# Patient Record
Sex: MALE | Race: WHITE | NOT HISPANIC OR LATINO | Employment: FULL TIME
[De-identification: names, ages, dates, MRNs, and addresses within clinical notes are randomized per-mention and may not be internally consistent; named-entity substitution may affect disease eponyms.]

---

## 2017-04-24 PROBLEM — IMO0002 NUCLEAR CATARACT, BILATERAL: Status: ACTIVE | Noted: 2017-04-24

## 2017-08-29 PROCEDURE — 88305 TISSUE EXAM BY PATHOLOGIST: CPT | Performed by: INTERNAL MEDICINE

## 2018-04-16 ENCOUNTER — HOSPITAL (OUTPATIENT)
Dept: OTHER | Age: 63
End: 2018-04-16
Attending: EMERGENCY MEDICINE

## 2018-04-16 PROBLEM — H33.001 RETINAL DETACHMENT, RHEGMATOGENOUS, RIGHT EYE: Status: ACTIVE | Noted: 2018-04-16

## 2018-12-08 NOTE — LETTER
April Matta 182  [address] [facility] [address], [address] [facility]  Authorization for Surgical Operation and Procedure     Date:___________                                                                                                         Time:__________ Telephone Encounter by Naveen Youssef RN at 05/15/17 08:56 AM     Author:  Naveen Youssef RN Service:  (none) Author Type:  Registered Nurse     Filed:  05/15/17 09:02 AM Encounter Date:  5/15/2017 Status:  Signed     :  Naveen Youssef RN (Registered Nurse)            Patient states she has intermittent nausea, occasionally will vomit, states she feels like she has 'nervous stomach', feels like 'spasms'. Patient takes Flexirel, states her stomach will feel better after she takes this. Patient states it feels like her stomach is pushing her food back up, and burps foods hours later. Patient states she takes Prevacid 30mg before breakfast and dinner, states she will take Zantac OTC 150mg at lunch. Denies abd pain. Patient states excessive gas, burping. Patient states her BM are normal.  Msg to KJ.[TD1.1M]  Electronically Signed by:     Naveen Youssef RN , 5/15/2017[TD1.2T]        Revision History        User Key Date/Time User Provider Type Action    > TD1.2 05/15/17 09:02 AM Naveen Youssef RN Registered Nurse Sign     TD1.1 05/15/17 08:56 AM Naveen Youssef RN Registered Nurse     M - Manual, T - Template that can occur: fever and allergic reactions, hemolytic reactions, transmission of diseases such as Hepatitis, AIDS and Cytomegalovirus (CMV) and fluid overload.   In the event that I wish to have an autologous transfusion of my own blood, or a directed don when the applicable recovery period ends for purposes of reinstating the DNAR order.   10. Patients having a sterilization procedure: I understand that if the procedure is successful the results will be permanent and it will therefore be impossible for me t to give me medicine and do additional procedures as necessary. Some examples are: Starting or using an “IV” to give me medicine, fluids or blood during my procedure, and having a breathing tube placed to help me breathe when I’m asleep (intubation).  In the that rare but potential complications include headache, bleeding, infection, seizure, irregular heart rhythms, and nerve injury.     I can change my mind about having anesthesia services at any time before I get the medicine.    ____________________________

## 2020-08-27 ENCOUNTER — HOSPITAL ENCOUNTER (OUTPATIENT)
Age: 65
Setting detail: OBSERVATION
Discharge: HOME | End: 2020-08-28
Attending: EMERGENCY MEDICINE | Admitting: INTERNAL MEDICINE

## 2020-08-27 DIAGNOSIS — T78.3XXA ANGIOEDEMA OF LIPS, INITIAL ENCOUNTER: Primary | ICD-10-CM

## 2020-08-27 LAB
ANION GAP SERPL CALC-SCNC: 12 MMOL/L (ref 10–20)
BASOPHILS # BLD: 0 K/MCL (ref 0–0.3)
BASOPHILS NFR BLD: 0 %
BUN SERPL-MCNC: 26 MG/DL (ref 6–20)
BUN/CREAT SERPL: 30 (ref 7–25)
CALCIUM SERPL-MCNC: 8.4 MG/DL (ref 8.4–10.2)
CHLORIDE SERPL-SCNC: 109 MMOL/L (ref 98–107)
CO2 SERPL-SCNC: 25 MMOL/L (ref 21–32)
CREAT SERPL-MCNC: 0.87 MG/DL (ref 0.67–1.17)
DIFFERENTIAL METHOD BLD: NORMAL
EOSINOPHIL # BLD: 0.2 K/MCL (ref 0.1–0.5)
EOSINOPHIL NFR BLD: 2 %
ERYTHROCYTE [DISTWIDTH] IN BLOOD: 12.8 % (ref 11–15)
GLUCOSE SERPL-MCNC: 117 MG/DL (ref 65–99)
HCT VFR BLD CALC: 44.5 % (ref 39–51)
HGB BLD-MCNC: 14.9 G/DL (ref 13–17)
IMM GRANULOCYTES # BLD AUTO: 0 K/MCL (ref 0–0.2)
IMM GRANULOCYTES NFR BLD: 0 %
LYMPHOCYTES # BLD: 1.7 K/MCL (ref 1–4)
LYMPHOCYTES NFR BLD: 24 %
MCH RBC QN AUTO: 30.9 PG (ref 26–34)
MCHC RBC AUTO-ENTMCNC: 33.5 G/DL (ref 32–36.5)
MCV RBC AUTO: 92.3 FL (ref 78–100)
MONOCYTES # BLD: 0.6 K/MCL (ref 0.3–0.9)
MONOCYTES NFR BLD: 8 %
NEUTROPHILS # BLD: 4.7 K/MCL (ref 1.8–7.7)
NEUTROPHILS NFR BLD: 66 %
NRBC BLD MANUAL-RTO: 0 /100 WBC
PLATELET # BLD: 214 K/MCL (ref 140–450)
POTASSIUM SERPL-SCNC: 3.5 MMOL/L (ref 3.4–5.1)
RBC # BLD: 4.82 MIL/MCL (ref 4.5–5.9)
SODIUM SERPL-SCNC: 142 MMOL/L (ref 135–145)
WBC # BLD: 7.1 K/MCL (ref 4.2–11)

## 2020-08-27 PROCEDURE — 10002801 HB RX 250 W/O HCPCS: Performed by: EMERGENCY MEDICINE

## 2020-08-27 PROCEDURE — 80320 DRUG SCREEN QUANTALCOHOLS: CPT

## 2020-08-27 PROCEDURE — 96374 THER/PROPH/DIAG INJ IV PUSH: CPT

## 2020-08-27 PROCEDURE — 99284 EMERGENCY DEPT VISIT MOD MDM: CPT

## 2020-08-27 PROCEDURE — 10002800 HB RX 250 W HCPCS: Performed by: EMERGENCY MEDICINE

## 2020-08-27 PROCEDURE — 96372 THER/PROPH/DIAG INJ SC/IM: CPT

## 2020-08-27 PROCEDURE — 80048 BASIC METABOLIC PNL TOTAL CA: CPT

## 2020-08-27 PROCEDURE — G0378 HOSPITAL OBSERVATION PER HR: HCPCS

## 2020-08-27 PROCEDURE — 96375 TX/PRO/DX INJ NEW DRUG ADDON: CPT

## 2020-08-27 PROCEDURE — 85025 COMPLETE CBC W/AUTO DIFF WBC: CPT

## 2020-08-27 PROCEDURE — G0480 DRUG TEST DEF 1-7 CLASSES: HCPCS

## 2020-08-27 RX ORDER — ATORVASTATIN CALCIUM 40 MG/1
40 TABLET, FILM COATED ORAL DAILY
COMMUNITY

## 2020-08-27 RX ORDER — ATORVASTATIN CALCIUM 40 MG/1
40 TABLET, FILM COATED ORAL DAILY
Status: DISCONTINUED | OUTPATIENT
Start: 2020-08-28 | End: 2020-08-28 | Stop reason: HOSPADM

## 2020-08-27 RX ORDER — LOSARTAN POTASSIUM 50 MG/1
50 TABLET ORAL DAILY
Status: ON HOLD | COMMUNITY
End: 2020-08-28 | Stop reason: HOSPADM

## 2020-08-27 RX ORDER — PANTOPRAZOLE SODIUM 40 MG/1
40 TABLET, DELAYED RELEASE ORAL
Status: DISCONTINUED | OUTPATIENT
Start: 2020-08-28 | End: 2020-08-28 | Stop reason: HOSPADM

## 2020-08-27 RX ORDER — CHOLECALCIFEROL (VITAMIN D3) 125 MCG
500 CAPSULE ORAL DAILY
COMMUNITY

## 2020-08-27 RX ORDER — POTASSIUM CHLORIDE 20 MEQ/1
40 TABLET, EXTENDED RELEASE ORAL ONCE
Status: DISCONTINUED | OUTPATIENT
Start: 2020-08-28 | End: 2020-08-27 | Stop reason: CLARIF

## 2020-08-27 RX ORDER — OMEPRAZOLE 20 MG/1
20 CAPSULE, DELAYED RELEASE ORAL DAILY
COMMUNITY

## 2020-08-27 RX ORDER — ENOXAPARIN SODIUM 100 MG/ML
40 INJECTION SUBCUTANEOUS EVERY 24 HOURS
Status: DISCONTINUED | OUTPATIENT
Start: 2020-08-28 | End: 2020-08-28 | Stop reason: HOSPADM

## 2020-08-27 RX ORDER — METHYLPREDNISOLONE SODIUM SUCCINATE 40 MG/ML
80 INJECTION, POWDER, LYOPHILIZED, FOR SOLUTION INTRAMUSCULAR; INTRAVENOUS EVERY 8 HOURS SCHEDULED
Status: DISCONTINUED | OUTPATIENT
Start: 2020-08-28 | End: 2020-08-28 | Stop reason: HOSPADM

## 2020-08-27 RX ORDER — DIPHENHYDRAMINE HYDROCHLORIDE 50 MG/ML
25 INJECTION INTRAMUSCULAR; INTRAVENOUS EVERY 8 HOURS
Status: DISCONTINUED | OUTPATIENT
Start: 2020-08-28 | End: 2020-08-28 | Stop reason: HOSPADM

## 2020-08-27 RX ORDER — DIPHENHYDRAMINE HYDROCHLORIDE 50 MG/ML
50 INJECTION INTRAMUSCULAR; INTRAVENOUS ONCE
Status: COMPLETED | OUTPATIENT
Start: 2020-08-27 | End: 2020-08-27

## 2020-08-27 RX ORDER — CHOLECALCIFEROL (VITAMIN D3) 125 MCG
500 CAPSULE ORAL DAILY
Status: DISCONTINUED | OUTPATIENT
Start: 2020-08-28 | End: 2020-08-28 | Stop reason: HOSPADM

## 2020-08-27 RX ORDER — FAMOTIDINE 10 MG/ML
20 INJECTION, SOLUTION INTRAVENOUS EVERY 12 HOURS SCHEDULED
Status: DISCONTINUED | OUTPATIENT
Start: 2020-08-28 | End: 2020-08-28 | Stop reason: HOSPADM

## 2020-08-27 RX ORDER — POTASSIUM CHLORIDE 20 MEQ/1
40 TABLET, EXTENDED RELEASE ORAL ONCE
Status: COMPLETED | OUTPATIENT
Start: 2020-08-28 | End: 2020-08-28

## 2020-08-27 RX ORDER — METHYLPREDNISOLONE SODIUM SUCCINATE 125 MG/2ML
125 INJECTION, POWDER, LYOPHILIZED, FOR SOLUTION INTRAMUSCULAR; INTRAVENOUS ONCE
Status: COMPLETED | OUTPATIENT
Start: 2020-08-27 | End: 2020-08-27

## 2020-08-27 RX ORDER — FAMOTIDINE 10 MG/ML
20 INJECTION, SOLUTION INTRAVENOUS ONCE
Status: COMPLETED | OUTPATIENT
Start: 2020-08-27 | End: 2020-08-27

## 2020-08-27 RX ORDER — VALACYCLOVIR HYDROCHLORIDE 500 MG/1
1000 TABLET, FILM COATED ORAL DAILY
Status: DISCONTINUED | OUTPATIENT
Start: 2020-08-28 | End: 2020-08-28 | Stop reason: HOSPADM

## 2020-08-27 RX ORDER — VALACYCLOVIR HYDROCHLORIDE 1 G/1
1000 TABLET, FILM COATED ORAL DAILY
COMMUNITY

## 2020-08-27 RX ADMIN — EPINEPHRINE 0.3 MG: 1 INJECTION, SOLUTION, CONCENTRATE INTRAVENOUS at 20:19

## 2020-08-27 RX ADMIN — DIPHENHYDRAMINE HYDROCHLORIDE 50 MG: 50 INJECTION INTRAMUSCULAR; INTRAVENOUS at 20:19

## 2020-08-27 RX ADMIN — METHYLPREDNISOLONE SODIUM SUCCINATE 125 MG: 125 INJECTION, POWDER, FOR SOLUTION INTRAMUSCULAR; INTRAVENOUS at 20:19

## 2020-08-27 RX ADMIN — FAMOTIDINE 20 MG: 10 INJECTION, SOLUTION INTRAVENOUS at 20:19

## 2020-08-27 ASSESSMENT — LIFESTYLE VARIABLES
ANXIETY: NO ANXIETY, AT EASE
AGITATION: NORMAL ACTIVITY
TACTILE DISTURBANCES: NOT PRESENT
HOW OFTEN DO YOU HAVE A DRINK CONTAINING ALCOHOL: 4 OR MORE TIMES PER WEEK
HOW MANY STANDARD DRINKS CONTAINING ALCOHOL DO YOU HAVE ON A TYPICAL DAY: 0,1 OR 2
PAROXYSMAL SWEATS: BARELY PERCEPTIBLE SWEATING, PALMS MOIST
ALCOHOL_USE_STATUS: NO OR LOW RISK WITH VALIDATED TOOL
HOW OFTEN DO YOU HAVE 6 OR MORE DRINKS ON ONE OCCASION: NEVER
AUDIT-C TOTAL SCORE: 4
VISUAL DISTURBANCES: NOT PRESENT
NAUSEA AND VOMITING: NO NAUSEA AND NO VOMITING
HEADACHE, FULLNESS IN HEAD: NOT PRESENT
TREMOR: NO TREMOR
AUDITORY DISTURBANCES: NOT PRESENT

## 2020-08-27 ASSESSMENT — ACTIVITIES OF DAILY LIVING (ADL)
ADL_SHORT_OF_BREATH: NO
ADL_SCORE: 12
RECENT_DECLINE_ADL: NO
ADL_BEFORE_ADMISSION: INDEPENDENT
CHRONIC_PAIN_PRESENT: NO

## 2020-08-27 ASSESSMENT — PATIENT HEALTH QUESTIONNAIRE - PHQ9
SUM OF ALL RESPONSES TO PHQ9 QUESTIONS 1 AND 2: 0
2. FEELING DOWN, DEPRESSED OR HOPELESS: NOT AT ALL
CLINICAL INTERPRETATION OF PHQ2 SCORE: NO FURTHER SCREENING NEEDED
IS PATIENT ABLE TO COMPLETE PHQ2 OR PHQ9: YES
SUM OF ALL RESPONSES TO PHQ9 QUESTIONS 1 AND 2: 0
1. LITTLE INTEREST OR PLEASURE IN DOING THINGS: NOT AT ALL
CLINICAL INTERPRETATION OF PHQ9 SCORE: NO FURTHER SCREENING NEEDED

## 2020-08-27 ASSESSMENT — COGNITIVE AND FUNCTIONAL STATUS - GENERAL
DO YOU HAVE SERIOUS DIFFICULTY WALKING OR CLIMBING STAIRS: NO
BECAUSE OF A PHYSICAL, MENTAL, OR EMOTIONAL CONDITION, DO YOU HAVE SERIOUS DIFFICULTY CONCENTRATING, REMEMBERING OR MAKING DECISIONS: NO
DO YOU HAVE DIFFICULTY DRESSING OR BATHING: NO
ARE YOU BLIND OR DO YOU HAVE SERIOUS DIFFICULTY SEEING, EVEN WHEN WEARING GLASSES: NO
BECAUSE OF A PHYSICAL, MENTAL, OR EMOTIONAL CONDITION, DO YOU HAVE DIFFICULTY DOING ERRANDS ALONE: NO
ARE YOU DEAF OR DO YOU HAVE SERIOUS DIFFICULTY  HEARING: NO

## 2020-08-27 ASSESSMENT — PAIN SCALES - GENERAL: PAINLEVEL_OUTOF10: 0

## 2020-08-27 ASSESSMENT — COLUMBIA-SUICIDE SEVERITY RATING SCALE - C-SSRS
IS THE PATIENT ABLE TO COMPLETE C-SSRS: YES
2. HAVE YOU ACTUALLY HAD ANY THOUGHTS OF KILLING YOURSELF?: NO
6. HAVE YOU EVER DONE ANYTHING, STARTED TO DO ANYTHING, OR PREPARED TO DO ANYTHING TO END YOUR LIFE?: NO
1. WITHIN THE PAST MONTH, HAVE YOU WISHED YOU WERE DEAD OR WISHED YOU COULD GO TO SLEEP AND NOT WAKE UP?: NO

## 2020-08-28 VITALS
SYSTOLIC BLOOD PRESSURE: 160 MMHG | OXYGEN SATURATION: 95 % | BODY MASS INDEX: 35.24 KG/M2 | WEIGHT: 260.14 LBS | HEART RATE: 83 BPM | DIASTOLIC BLOOD PRESSURE: 80 MMHG | TEMPERATURE: 98.2 F | RESPIRATION RATE: 15 BRPM | HEIGHT: 72 IN

## 2020-08-28 LAB
ANION GAP SERPL CALC-SCNC: 10 MMOL/L (ref 10–20)
BUN SERPL-MCNC: 21 MG/DL (ref 6–20)
BUN/CREAT SERPL: 30 (ref 7–25)
CALCIUM SERPL-MCNC: 8.8 MG/DL (ref 8.4–10.2)
CHLORIDE SERPL-SCNC: 111 MMOL/L (ref 98–107)
CO2 SERPL-SCNC: 24 MMOL/L (ref 21–32)
CREAT SERPL-MCNC: 0.7 MG/DL (ref 0.67–1.17)
ERYTHROCYTE [DISTWIDTH] IN BLOOD: 12.8 % (ref 11–15)
ETHANOL SERPL-MCNC: 22 MG/DL
GLUCOSE SERPL-MCNC: 146 MG/DL (ref 65–99)
HCT VFR BLD CALC: 43.1 % (ref 39–51)
HGB BLD-MCNC: 14.5 G/DL (ref 13–17)
MCH RBC QN AUTO: 31.1 PG (ref 26–34)
MCHC RBC AUTO-ENTMCNC: 33.6 G/DL (ref 32–36.5)
MCV RBC AUTO: 92.5 FL (ref 78–100)
NRBC BLD MANUAL-RTO: 0 /100 WBC
PLATELET # BLD: 210 K/MCL (ref 140–450)
POTASSIUM SERPL-SCNC: 4.1 MMOL/L (ref 3.4–5.1)
RBC # BLD: 4.66 MIL/MCL (ref 4.5–5.9)
SODIUM SERPL-SCNC: 141 MMOL/L (ref 135–145)
WBC # BLD: 5 K/MCL (ref 4.2–11)

## 2020-08-28 PROCEDURE — 10002803 HB RX 637: Performed by: INTERNAL MEDICINE

## 2020-08-28 PROCEDURE — 96376 TX/PRO/DX INJ SAME DRUG ADON: CPT

## 2020-08-28 PROCEDURE — G0378 HOSPITAL OBSERVATION PER HR: HCPCS

## 2020-08-28 PROCEDURE — 96372 THER/PROPH/DIAG INJ SC/IM: CPT

## 2020-08-28 PROCEDURE — 85027 COMPLETE CBC AUTOMATED: CPT

## 2020-08-28 PROCEDURE — 80048 BASIC METABOLIC PNL TOTAL CA: CPT

## 2020-08-28 PROCEDURE — 36415 COLL VENOUS BLD VENIPUNCTURE: CPT

## 2020-08-28 PROCEDURE — 10002801 HB RX 250 W/O HCPCS: Performed by: INTERNAL MEDICINE

## 2020-08-28 PROCEDURE — 10002800 HB RX 250 W HCPCS: Performed by: INTERNAL MEDICINE

## 2020-08-28 RX ORDER — PREDNISONE 20 MG/1
40 TABLET ORAL DAILY
Qty: 10 TABLET | Refills: 0 | Status: SHIPPED | OUTPATIENT
Start: 2020-08-28 | End: 2020-09-02

## 2020-08-28 RX ORDER — HYDROCHLOROTHIAZIDE 25 MG/1
25 TABLET ORAL DAILY
Qty: 30 TABLET | Refills: 0 | Status: ON HOLD | OUTPATIENT
Start: 2020-08-28 | End: 2020-08-31

## 2020-08-28 RX ADMIN — DIPHENHYDRAMINE HYDROCHLORIDE 25 MG: 50 INJECTION INTRAMUSCULAR; INTRAVENOUS at 09:48

## 2020-08-28 RX ADMIN — DIPHENHYDRAMINE HYDROCHLORIDE 25 MG: 50 INJECTION INTRAMUSCULAR; INTRAVENOUS at 00:09

## 2020-08-28 RX ADMIN — METHYLPREDNISOLONE SODIUM SUCCINATE 80 MG: 40 INJECTION, POWDER, FOR SOLUTION INTRAMUSCULAR; INTRAVENOUS at 06:11

## 2020-08-28 RX ADMIN — Medication 500 MCG: at 09:46

## 2020-08-28 RX ADMIN — FAMOTIDINE 20 MG: 10 INJECTION INTRAVENOUS at 09:48

## 2020-08-28 RX ADMIN — DESMOPRESSIN ACETATE 40 MG: 0.2 TABLET ORAL at 09:46

## 2020-08-28 RX ADMIN — VALACYCLOVIR HYDROCHLORIDE 1000 MG: 500 TABLET, FILM COATED ORAL at 09:46

## 2020-08-28 RX ADMIN — POTASSIUM CHLORIDE 40 MEQ: 1500 TABLET, EXTENDED RELEASE ORAL at 00:08

## 2020-08-28 RX ADMIN — PANTOPRAZOLE SODIUM 40 MG: 40 TABLET, DELAYED RELEASE ORAL at 06:11

## 2020-08-28 RX ADMIN — ENOXAPARIN SODIUM 40 MG: 40 INJECTION SUBCUTANEOUS at 00:09

## 2020-08-28 ASSESSMENT — LIFESTYLE VARIABLES
AUDITORY DISTURBANCES: NOT PRESENT
TREMOR: NO TREMOR
PAROXYSMAL SWEATS: NO SWEAT VISIBLE
HEADACHE, FULLNESS IN HEAD: NOT PRESENT
AGITATION: NORMAL ACTIVITY
VISUAL DISTURBANCES: NOT PRESENT
TACTILE DISTURBANCES: NOT PRESENT
ANXIETY: NO ANXIETY, AT EASE
NAUSEA AND VOMITING: NO NAUSEA AND NO VOMITING

## 2020-08-28 ASSESSMENT — ENCOUNTER SYMPTOMS
NUMBNESS: 0
RHINORRHEA: 0
CONFUSION: 0
HEADACHES: 0
SHORTNESS OF BREATH: 0
ABDOMINAL PAIN: 0
NAUSEA: 0
FACIAL SWELLING: 1
DIARRHEA: 0
VOMITING: 0
EYE PAIN: 0
SINUS PRESSURE: 0
SORE THROAT: 0
FEVER: 0
DIZZINESS: 0
WHEEZING: 0
ADENOPATHY: 0
WEAKNESS: 0
PHOTOPHOBIA: 0
HALLUCINATIONS: 0
FATIGUE: 0
CHILLS: 0
COUGH: 0

## 2020-08-28 ASSESSMENT — PAIN SCALES - GENERAL
PAINLEVEL_OUTOF10: 0
PAINLEVEL_OUTOF10: 0

## 2020-08-31 ENCOUNTER — WALK IN (OUTPATIENT)
Dept: URGENT CARE | Age: 65
End: 2020-08-31
Attending: EMERGENCY MEDICINE

## 2020-08-31 ENCOUNTER — HOSPITAL ENCOUNTER (OUTPATIENT)
Age: 65
Setting detail: OBSERVATION
Discharge: HOME | End: 2020-09-01
Attending: EMERGENCY MEDICINE | Admitting: HOSPITALIST

## 2020-08-31 VITALS
DIASTOLIC BLOOD PRESSURE: 112 MMHG | RESPIRATION RATE: 18 BRPM | SYSTOLIC BLOOD PRESSURE: 176 MMHG | WEIGHT: 255.73 LBS | BODY MASS INDEX: 34.68 KG/M2 | TEMPERATURE: 98.6 F | OXYGEN SATURATION: 98 % | HEART RATE: 82 BPM

## 2020-08-31 DIAGNOSIS — T78.3XXA ANGIOEDEMA, INITIAL ENCOUNTER: Primary | ICD-10-CM

## 2020-08-31 DIAGNOSIS — E87.6 HYPOKALEMIA: ICD-10-CM

## 2020-08-31 LAB
ANION GAP SERPL CALC-SCNC: 9 MMOL/L (ref 10–20)
BASOPHILS # BLD: 0 K/MCL (ref 0–0.3)
BASOPHILS NFR BLD: 1 %
BUN SERPL-MCNC: 15 MG/DL (ref 6–20)
BUN/CREAT SERPL: 18 (ref 7–25)
CALCIUM SERPL-MCNC: 8.1 MG/DL (ref 8.4–10.2)
CHLORIDE SERPL-SCNC: 108 MMOL/L (ref 98–107)
CO2 SERPL-SCNC: 28 MMOL/L (ref 21–32)
CREAT SERPL-MCNC: 0.82 MG/DL (ref 0.67–1.17)
DIFFERENTIAL METHOD BLD: NORMAL
EOSINOPHIL # BLD: 0.1 K/MCL (ref 0.1–0.5)
EOSINOPHIL NFR BLD: 1 %
ERYTHROCYTE [DISTWIDTH] IN BLOOD: 12.9 % (ref 11–15)
GLUCOSE SERPL-MCNC: 97 MG/DL (ref 65–99)
HCT VFR BLD CALC: 42.7 % (ref 39–51)
HGB BLD-MCNC: 14.3 G/DL (ref 13–17)
IMM GRANULOCYTES # BLD AUTO: 0.1 K/MCL (ref 0–0.2)
IMM GRANULOCYTES NFR BLD: 1 %
LYMPHOCYTES # BLD: 3 K/MCL (ref 1–4)
LYMPHOCYTES NFR BLD: 39 %
MAGNESIUM SERPL-MCNC: 1.9 MG/DL (ref 1.7–2.4)
MCH RBC QN AUTO: 30.8 PG (ref 26–34)
MCHC RBC AUTO-ENTMCNC: 33.5 G/DL (ref 32–36.5)
MCV RBC AUTO: 92 FL (ref 78–100)
MONOCYTES # BLD: 0.6 K/MCL (ref 0.3–0.9)
MONOCYTES NFR BLD: 8 %
NEUTROPHILS # BLD: 4 K/MCL (ref 1.8–7.7)
NEUTROPHILS NFR BLD: 50 %
NRBC BLD MANUAL-RTO: 0 /100 WBC
PLATELET # BLD: 208 K/MCL (ref 140–450)
POTASSIUM SERPL-SCNC: 2.9 MMOL/L (ref 3.4–5.1)
POTASSIUM SERPL-SCNC: 3.8 MMOL/L (ref 3.4–5.1)
RBC # BLD: 4.64 MIL/MCL (ref 4.5–5.9)
SODIUM SERPL-SCNC: 142 MMOL/L (ref 135–145)
WBC # BLD: 7.7 K/MCL (ref 4.2–11)

## 2020-08-31 PROCEDURE — 10002800 HB RX 250 W HCPCS: Performed by: EMERGENCY MEDICINE

## 2020-08-31 PROCEDURE — 96376 TX/PRO/DX INJ SAME DRUG ADON: CPT

## 2020-08-31 PROCEDURE — 96365 THER/PROPH/DIAG IV INF INIT: CPT

## 2020-08-31 PROCEDURE — 99285 EMERGENCY DEPT VISIT HI MDM: CPT

## 2020-08-31 PROCEDURE — 80048 BASIC METABOLIC PNL TOTAL CA: CPT

## 2020-08-31 PROCEDURE — 96375 TX/PRO/DX INJ NEW DRUG ADDON: CPT

## 2020-08-31 PROCEDURE — 10002800 HB RX 250 W HCPCS: Performed by: HOSPITALIST

## 2020-08-31 PROCEDURE — G0378 HOSPITAL OBSERVATION PER HR: HCPCS

## 2020-08-31 PROCEDURE — 10002801 HB RX 250 W/O HCPCS

## 2020-08-31 PROCEDURE — 83735 ASSAY OF MAGNESIUM: CPT

## 2020-08-31 PROCEDURE — 99215 OFFICE O/P EST HI 40 MIN: CPT

## 2020-08-31 PROCEDURE — 10002801 HB RX 250 W/O HCPCS: Performed by: EMERGENCY MEDICINE

## 2020-08-31 PROCEDURE — 10002800 HB RX 250 W HCPCS

## 2020-08-31 PROCEDURE — G0463 HOSPITAL OUTPT CLINIC VISIT: HCPCS

## 2020-08-31 PROCEDURE — 10004651 HB RX, NO CHARGE ITEM: Performed by: HOSPITALIST

## 2020-08-31 PROCEDURE — 85025 COMPLETE CBC W/AUTO DIFF WBC: CPT

## 2020-08-31 PROCEDURE — 36415 COLL VENOUS BLD VENIPUNCTURE: CPT

## 2020-08-31 PROCEDURE — 10002807 HB RX 258: Performed by: EMERGENCY MEDICINE

## 2020-08-31 PROCEDURE — 10002801 HB RX 250 W/O HCPCS: Performed by: HOSPITALIST

## 2020-08-31 PROCEDURE — 10002803 HB RX 637: Performed by: HOSPITALIST

## 2020-08-31 PROCEDURE — 84132 ASSAY OF SERUM POTASSIUM: CPT

## 2020-08-31 RX ORDER — AMLODIPINE BESYLATE 5 MG/1
5 TABLET ORAL DAILY
Status: ON HOLD | COMMUNITY
Start: 2020-08-29 | End: 2020-09-01 | Stop reason: HOSPADM

## 2020-08-31 RX ORDER — ATORVASTATIN CALCIUM 40 MG/1
40 TABLET, FILM COATED ORAL DAILY
Status: DISCONTINUED | OUTPATIENT
Start: 2020-08-31 | End: 2020-09-01 | Stop reason: HOSPADM

## 2020-08-31 RX ORDER — DIPHENHYDRAMINE HYDROCHLORIDE 50 MG/ML
INJECTION INTRAMUSCULAR; INTRAVENOUS
Status: COMPLETED
Start: 2020-08-31 | End: 2020-08-31

## 2020-08-31 RX ORDER — HYDRALAZINE HYDROCHLORIDE 10 MG/1
10 TABLET, FILM COATED ORAL 3 TIMES DAILY
Status: DISCONTINUED | OUTPATIENT
Start: 2020-08-31 | End: 2020-09-01 | Stop reason: HOSPADM

## 2020-08-31 RX ORDER — 0.9 % SODIUM CHLORIDE 0.9 %
2 VIAL (ML) INJECTION EVERY 12 HOURS SCHEDULED
Status: DISCONTINUED | OUTPATIENT
Start: 2020-08-31 | End: 2020-09-01 | Stop reason: HOSPADM

## 2020-08-31 RX ORDER — ENOXAPARIN SODIUM 100 MG/ML
40 INJECTION SUBCUTANEOUS DAILY
Status: DISCONTINUED | OUTPATIENT
Start: 2020-08-31 | End: 2020-09-01 | Stop reason: HOSPADM

## 2020-08-31 RX ORDER — DIPHENHYDRAMINE HYDROCHLORIDE 50 MG/ML
25 INJECTION INTRAMUSCULAR; INTRAVENOUS EVERY 6 HOURS
Status: DISCONTINUED | OUTPATIENT
Start: 2020-08-31 | End: 2020-09-01 | Stop reason: HOSPADM

## 2020-08-31 RX ORDER — FAMOTIDINE 10 MG/ML
20 INJECTION, SOLUTION INTRAVENOUS EVERY 12 HOURS SCHEDULED
Status: DISCONTINUED | OUTPATIENT
Start: 2020-08-31 | End: 2020-09-01 | Stop reason: HOSPADM

## 2020-08-31 RX ORDER — METHYLPREDNISOLONE SODIUM SUCCINATE 40 MG/ML
60 INJECTION, POWDER, LYOPHILIZED, FOR SOLUTION INTRAMUSCULAR; INTRAVENOUS EVERY 6 HOURS SCHEDULED
Status: DISCONTINUED | OUTPATIENT
Start: 2020-08-31 | End: 2020-09-01 | Stop reason: HOSPADM

## 2020-08-31 RX ORDER — CHOLECALCIFEROL (VITAMIN D3) 125 MCG
500 CAPSULE ORAL DAILY
Status: DISCONTINUED | OUTPATIENT
Start: 2020-09-01 | End: 2020-09-01 | Stop reason: HOSPADM

## 2020-08-31 RX ORDER — FAMOTIDINE 10 MG/ML
INJECTION, SOLUTION INTRAVENOUS
Status: COMPLETED
Start: 2020-08-31 | End: 2020-08-31

## 2020-08-31 RX ORDER — POTASSIUM CHLORIDE 14.9 MG/ML
20 INJECTION INTRAVENOUS ONCE
Status: COMPLETED | OUTPATIENT
Start: 2020-08-31 | End: 2020-08-31

## 2020-08-31 RX ORDER — EPINEPHRINE 0.3 MG/.3ML
0.3 INJECTION SUBCUTANEOUS
Qty: 2 EACH | Refills: 0 | Status: SHIPPED | OUTPATIENT
Start: 2020-08-31

## 2020-08-31 RX ORDER — VALACYCLOVIR HYDROCHLORIDE 500 MG/1
1000 TABLET, FILM COATED ORAL DAILY
Status: DISCONTINUED | OUTPATIENT
Start: 2020-08-31 | End: 2020-09-01 | Stop reason: HOSPADM

## 2020-08-31 RX ORDER — POTASSIUM CHLORIDE 20 MEQ/1
40 TABLET, EXTENDED RELEASE ORAL ONCE
Status: COMPLETED | OUTPATIENT
Start: 2020-08-31 | End: 2020-08-31

## 2020-08-31 RX ORDER — METHYLPREDNISOLONE SODIUM SUCCINATE 125 MG/2ML
125 INJECTION, POWDER, LYOPHILIZED, FOR SOLUTION INTRAMUSCULAR; INTRAVENOUS ONCE
Status: COMPLETED | OUTPATIENT
Start: 2020-08-31 | End: 2020-08-31

## 2020-08-31 RX ADMIN — DIPHENHYDRAMINE HYDROCHLORIDE 25 MG: 50 INJECTION, SOLUTION INTRAMUSCULAR; INTRAVENOUS at 09:40

## 2020-08-31 RX ADMIN — DIPHENHYDRAMINE HYDROCHLORIDE 25 MG: 50 INJECTION INTRAMUSCULAR; INTRAVENOUS at 16:59

## 2020-08-31 RX ADMIN — Medication 0.3 ML: at 09:25

## 2020-08-31 RX ADMIN — FAMOTIDINE 20 MG: 10 INJECTION INTRAVENOUS at 09:40

## 2020-08-31 RX ADMIN — METHYLPREDNISOLONE SODIUM SUCCINATE 60 MG: 40 INJECTION, POWDER, FOR SOLUTION INTRAMUSCULAR; INTRAVENOUS at 17:00

## 2020-08-31 RX ADMIN — TRANEXAMIC ACID 1000 MG: 100 INJECTION, SOLUTION INTRAVENOUS at 10:32

## 2020-08-31 RX ADMIN — DESMOPRESSIN ACETATE 40 MG: 0.2 TABLET ORAL at 21:44

## 2020-08-31 RX ADMIN — HYDRALAZINE HYDROCHLORIDE 10 MG: 10 TABLET ORAL at 18:58

## 2020-08-31 RX ADMIN — POTASSIUM CHLORIDE 40 MEQ: 1500 TABLET, EXTENDED RELEASE ORAL at 18:44

## 2020-08-31 RX ADMIN — POTASSIUM CHLORIDE 20 MEQ: 14.9 INJECTION, SOLUTION INTRAVENOUS at 11:34

## 2020-08-31 RX ADMIN — VALACYCLOVIR HYDROCHLORIDE 1000 MG: 500 TABLET, FILM COATED ORAL at 18:44

## 2020-08-31 RX ADMIN — METHYLPREDNISOLONE SODIUM SUCCINATE 125 MG: 125 INJECTION, POWDER, FOR SOLUTION INTRAMUSCULAR; INTRAVENOUS at 10:08

## 2020-08-31 RX ADMIN — SODIUM CHLORIDE, PRESERVATIVE FREE 2 ML: 5 INJECTION INTRAVENOUS at 21:46

## 2020-08-31 RX ADMIN — DIPHENHYDRAMINE HYDROCHLORIDE 25 MG: 50 INJECTION INTRAMUSCULAR; INTRAVENOUS at 21:44

## 2020-08-31 RX ADMIN — FAMOTIDINE 20 MG: 10 INJECTION INTRAVENOUS at 21:47

## 2020-08-31 ASSESSMENT — LIFESTYLE VARIABLES
HOW MANY STANDARD DRINKS CONTAINING ALCOHOL DO YOU HAVE ON A TYPICAL DAY: 0,1 OR 2
HOW OFTEN DO YOU HAVE 6 OR MORE DRINKS ON ONE OCCASION: MONTHLY
HOW OFTEN DO YOU HAVE A DRINK CONTAINING ALCOHOL: 4 OR MORE TIMES PER WEEK
PRIOR ALCOHOL TREATMENT: NO
LAST DRINK YOU HAD: 48 HOURS OR LESS
HISTORY OF PROBLEMS WHEN YOU STOP DRINKING ALCOHOL: NO
AUDIT-C TOTAL SCORE: 6
ALCOHOL_USE_STATUS: UNHEALTHY DRINKING IDENTIFIED. AUDIT C: 3 OR MORE FOR WOMEN AND 4 OR MORE FOR MEN.

## 2020-08-31 ASSESSMENT — ACTIVITIES OF DAILY LIVING (ADL)
ADL_SHORT_OF_BREATH: YES
ADL_BEFORE_ADMISSION: INDEPENDENT
RECENT_DECLINE_ADL: NO
ADL_SCORE: 12
CHRONIC_PAIN_PRESENT: NO

## 2020-08-31 ASSESSMENT — PAIN SCALES - GENERAL
PAINLEVEL_OUTOF10: 0
PAINLEVEL_OUTOF10: 0

## 2020-08-31 ASSESSMENT — COGNITIVE AND FUNCTIONAL STATUS - GENERAL
BECAUSE OF A PHYSICAL, MENTAL, OR EMOTIONAL CONDITION, DO YOU HAVE SERIOUS DIFFICULTY CONCENTRATING, REMEMBERING OR MAKING DECISIONS: NO
DO YOU HAVE SERIOUS DIFFICULTY WALKING OR CLIMBING STAIRS: NO
BECAUSE OF A PHYSICAL, MENTAL, OR EMOTIONAL CONDITION, DO YOU HAVE DIFFICULTY DOING ERRANDS ALONE: NO
ARE YOU BLIND OR DO YOU HAVE SERIOUS DIFFICULTY SEEING, EVEN WHEN WEARING GLASSES: NO
DO YOU HAVE DIFFICULTY DRESSING OR BATHING: NO
ARE YOU DEAF OR DO YOU HAVE SERIOUS DIFFICULTY  HEARING: NO

## 2020-08-31 ASSESSMENT — PATIENT HEALTH QUESTIONNAIRE - PHQ9
SUM OF ALL RESPONSES TO PHQ9 QUESTIONS 1 AND 2: 0
IS PATIENT ABLE TO COMPLETE PHQ2 OR PHQ9: YES
2. FEELING DOWN, DEPRESSED OR HOPELESS: NOT AT ALL
CLINICAL INTERPRETATION OF PHQ9 SCORE: NO FURTHER SCREENING NEEDED
SUM OF ALL RESPONSES TO PHQ9 QUESTIONS 1 AND 2: 0
1. LITTLE INTEREST OR PLEASURE IN DOING THINGS: NOT AT ALL
CLINICAL INTERPRETATION OF PHQ2 SCORE: NO FURTHER SCREENING NEEDED

## 2020-08-31 ASSESSMENT — COLUMBIA-SUICIDE SEVERITY RATING SCALE - C-SSRS
2. HAVE YOU ACTUALLY HAD ANY THOUGHTS OF KILLING YOURSELF?: NO
6. HAVE YOU EVER DONE ANYTHING, STARTED TO DO ANYTHING, OR PREPARED TO DO ANYTHING TO END YOUR LIFE?: NO
1. WITHIN THE PAST MONTH, HAVE YOU WISHED YOU WERE DEAD OR WISHED YOU COULD GO TO SLEEP AND NOT WAKE UP?: NO
IS THE PATIENT ABLE TO COMPLETE C-SSRS: YES

## 2020-08-31 ASSESSMENT — ENCOUNTER SYMPTOMS
FACIAL SWELLING: 1
ABDOMINAL PAIN: 0
VOMITING: 0
SHORTNESS OF BREATH: 0
WEAKNESS: 0
HEADACHES: 0
FEVER: 0
SORE THROAT: 1
TROUBLE SWALLOWING: 0
DIARRHEA: 0
VOICE CHANGE: 0

## 2020-09-01 VITALS
DIASTOLIC BLOOD PRESSURE: 75 MMHG | HEIGHT: 72 IN | OXYGEN SATURATION: 94 % | HEART RATE: 81 BPM | SYSTOLIC BLOOD PRESSURE: 150 MMHG | WEIGHT: 261.02 LBS | BODY MASS INDEX: 35.35 KG/M2 | RESPIRATION RATE: 14 BRPM | TEMPERATURE: 98.1 F

## 2020-09-01 LAB
ANION GAP SERPL CALC-SCNC: 11 MMOL/L (ref 10–20)
BUN SERPL-MCNC: 21 MG/DL (ref 6–20)
BUN/CREAT SERPL: 28 (ref 7–25)
CALCIUM SERPL-MCNC: 8.6 MG/DL (ref 8.4–10.2)
CHLORIDE SERPL-SCNC: 106 MMOL/L (ref 98–107)
CO2 SERPL-SCNC: 26 MMOL/L (ref 21–32)
CREAT SERPL-MCNC: 0.75 MG/DL (ref 0.67–1.17)
GLUCOSE SERPL-MCNC: 211 MG/DL (ref 65–99)
POTASSIUM SERPL-SCNC: 3.8 MMOL/L (ref 3.4–5.1)
SODIUM SERPL-SCNC: 139 MMOL/L (ref 135–145)

## 2020-09-01 PROCEDURE — 10002803 HB RX 637: Performed by: HOSPITALIST

## 2020-09-01 PROCEDURE — 36415 COLL VENOUS BLD VENIPUNCTURE: CPT

## 2020-09-01 PROCEDURE — 10002800 HB RX 250 W HCPCS: Performed by: HOSPITALIST

## 2020-09-01 PROCEDURE — G0378 HOSPITAL OBSERVATION PER HR: HCPCS

## 2020-09-01 PROCEDURE — 10002801 HB RX 250 W/O HCPCS: Performed by: HOSPITALIST

## 2020-09-01 PROCEDURE — 10004651 HB RX, NO CHARGE ITEM: Performed by: HOSPITALIST

## 2020-09-01 PROCEDURE — 96376 TX/PRO/DX INJ SAME DRUG ADON: CPT

## 2020-09-01 PROCEDURE — 80048 BASIC METABOLIC PNL TOTAL CA: CPT

## 2020-09-01 RX ORDER — HYDRALAZINE HYDROCHLORIDE 10 MG/1
10 TABLET, FILM COATED ORAL 3 TIMES DAILY
Qty: 90 TABLET | Refills: 0 | Status: SHIPPED | OUTPATIENT
Start: 2020-09-01

## 2020-09-01 RX ORDER — POTASSIUM CHLORIDE 20 MEQ/1
40 TABLET, EXTENDED RELEASE ORAL ONCE
Status: COMPLETED | OUTPATIENT
Start: 2020-09-01 | End: 2020-09-01

## 2020-09-01 RX ADMIN — HYDRALAZINE HYDROCHLORIDE 10 MG: 10 TABLET ORAL at 09:40

## 2020-09-01 RX ADMIN — METHYLPREDNISOLONE SODIUM SUCCINATE 60 MG: 40 INJECTION, POWDER, FOR SOLUTION INTRAMUSCULAR; INTRAVENOUS at 13:12

## 2020-09-01 RX ADMIN — METHYLPREDNISOLONE SODIUM SUCCINATE 60 MG: 40 INJECTION, POWDER, FOR SOLUTION INTRAMUSCULAR; INTRAVENOUS at 06:19

## 2020-09-01 RX ADMIN — POTASSIUM CHLORIDE 40 MEQ: 1500 TABLET, EXTENDED RELEASE ORAL at 13:12

## 2020-09-01 RX ADMIN — SODIUM CHLORIDE, PRESERVATIVE FREE 2 ML: 5 INJECTION INTRAVENOUS at 09:48

## 2020-09-01 RX ADMIN — Medication 500 MCG: at 09:40

## 2020-09-01 RX ADMIN — METHYLPREDNISOLONE SODIUM SUCCINATE 60 MG: 40 INJECTION, POWDER, FOR SOLUTION INTRAMUSCULAR; INTRAVENOUS at 00:01

## 2020-09-01 RX ADMIN — VALACYCLOVIR HYDROCHLORIDE 1000 MG: 500 TABLET, FILM COATED ORAL at 09:39

## 2020-09-01 RX ADMIN — DIPHENHYDRAMINE HYDROCHLORIDE 25 MG: 50 INJECTION INTRAMUSCULAR; INTRAVENOUS at 09:40

## 2020-09-01 RX ADMIN — FAMOTIDINE 20 MG: 10 INJECTION INTRAVENOUS at 09:40

## 2020-09-01 RX ADMIN — HYDRALAZINE HYDROCHLORIDE 10 MG: 10 TABLET ORAL at 13:12

## 2020-09-01 RX ADMIN — DIPHENHYDRAMINE HYDROCHLORIDE 25 MG: 50 INJECTION INTRAMUSCULAR; INTRAVENOUS at 03:49

## 2020-09-01 ASSESSMENT — PAIN SCALES - GENERAL: PAINLEVEL_OUTOF10: 0

## 2020-12-29 RX ORDER — ACETAMINOPHEN 500 MG
1000 TABLET ORAL ONCE
Status: CANCELLED | OUTPATIENT
Start: 2020-12-29 | End: 2020-12-29

## 2020-12-29 RX ORDER — SODIUM CHLORIDE, SODIUM LACTATE, POTASSIUM CHLORIDE, CALCIUM CHLORIDE 600; 310; 30; 20 MG/100ML; MG/100ML; MG/100ML; MG/100ML
INJECTION, SOLUTION INTRAVENOUS CONTINUOUS
Status: CANCELLED | OUTPATIENT
Start: 2020-12-29

## 2020-12-29 NOTE — H&P
[facility]    PATIENT'S NAME: Hola Court   ATTENDING PHYSICIAN: Sosa Martel M.D.    PATIENT ACCOUNT#:   [de-identified]    LOCATION:    MEDICAL RECORD #:   [id number]       YOB: 1955  ADMISSION DATE:       01/08/2021    HISTORY AND PH eye.    PAST SURGICAL HISTORY:  Colonoscopy. MEDICATIONS:  Norvasc, EpiPen, Prilosec, cetirizine, Lipitor, Valtrex. ALLERGIES:  ACE inhibitors, angioedema. FAMILY HISTORY:  Negative. SOCIAL HISTORY:  The patient is a cigar smoker.   He has neve

## 2021-01-05 ENCOUNTER — LAB ENCOUNTER (OUTPATIENT)
Dept: LAB | Facility: HOSPITAL | Age: 66
End: 2021-01-05
Attending: ORTHOPAEDIC SURGERY
Payer: COMMERCIAL

## 2021-01-05 DIAGNOSIS — S83.242A ACUTE MEDIAL MENISCUS TEAR OF LEFT KNEE, INITIAL ENCOUNTER: ICD-10-CM

## 2021-01-05 DIAGNOSIS — Z01.810 PRE-OPERATIVE CARDIOVASCULAR EXAMINATION: ICD-10-CM

## 2021-01-05 LAB
ANION GAP SERPL CALC-SCNC: 7 MMOL/L (ref 0–18)
APTT PPP: 30.9 SECONDS (ref 25.4–36.1)
ATRIAL RATE: 73 BPM
BASOPHILS # BLD AUTO: 0.02 X10(3) UL (ref 0–0.2)
BASOPHILS NFR BLD AUTO: 0.4 %
BUN BLD-MCNC: 15 MG/DL (ref 7–18)
BUN/CREAT SERPL: 14.7 (ref 10–20)
CALCIUM BLD-MCNC: 8.9 MG/DL (ref 8.5–10.1)
CHLORIDE SERPL-SCNC: 104 MMOL/L (ref 98–112)
CO2 SERPL-SCNC: 28 MMOL/L (ref 21–32)
CREAT BLD-MCNC: 1.02 MG/DL
DEPRECATED RDW RBC AUTO: 39.7 FL (ref 35.1–46.3)
EOSINOPHIL # BLD AUTO: 0.1 X10(3) UL (ref 0–0.7)
EOSINOPHIL NFR BLD AUTO: 2.2 %
ERYTHROCYTE [DISTWIDTH] IN BLOOD BY AUTOMATED COUNT: 12 % (ref 11–15)
GLUCOSE BLD-MCNC: 99 MG/DL (ref 70–99)
HCT VFR BLD AUTO: 45 %
HGB BLD-MCNC: 15.2 G/DL
IMM GRANULOCYTES # BLD AUTO: 0.02 X10(3) UL (ref 0–1)
IMM GRANULOCYTES NFR BLD: 0.4 %
INR BLD: 1.12 (ref 0.89–1.11)
LYMPHOCYTES # BLD AUTO: 1.41 X10(3) UL (ref 1–4)
LYMPHOCYTES NFR BLD AUTO: 30.9 %
MCH RBC QN AUTO: 30.5 PG (ref 26–34)
MCHC RBC AUTO-ENTMCNC: 33.8 G/DL (ref 31–37)
MCV RBC AUTO: 90.4 FL
MONOCYTES # BLD AUTO: 0.33 X10(3) UL (ref 0.1–1)
MONOCYTES NFR BLD AUTO: 7.2 %
NEUTROPHILS # BLD AUTO: 2.69 X10 (3) UL (ref 1.5–7.7)
NEUTROPHILS # BLD AUTO: 2.69 X10(3) UL (ref 1.5–7.7)
NEUTROPHILS NFR BLD AUTO: 58.9 %
OSMOLALITY SERPL CALC.SUM OF ELEC: 289 MOSM/KG (ref 275–295)
P AXIS: 12 DEGREES
P-R INTERVAL: 128 MS
PLATELET # BLD AUTO: 195 10(3)UL (ref 150–450)
POTASSIUM SERPL-SCNC: 3.9 MMOL/L (ref 3.5–5.1)
PSA SERPL DL<=0.01 NG/ML-MCNC: 14.7 SECONDS (ref 12.4–14.6)
Q-T INTERVAL: 408 MS
QRS DURATION: 82 MS
QTC CALCULATION (BEZET): 449 MS
R AXIS: -8 DEGREES
RBC # BLD AUTO: 4.98 X10(6)UL
SODIUM SERPL-SCNC: 139 MMOL/L (ref 136–145)
T AXIS: 23 DEGREES
VENTRICULAR RATE: 73 BPM
WBC # BLD AUTO: 4.6 X10(3) UL (ref 4–11)

## 2021-01-05 PROCEDURE — 87086 URINE CULTURE/COLONY COUNT: CPT

## 2021-01-05 PROCEDURE — 85025 COMPLETE CBC W/AUTO DIFF WBC: CPT

## 2021-01-05 PROCEDURE — 93010 ELECTROCARDIOGRAM REPORT: CPT | Performed by: INTERNAL MEDICINE

## 2021-01-05 PROCEDURE — 36415 COLL VENOUS BLD VENIPUNCTURE: CPT

## 2021-01-05 PROCEDURE — 87081 CULTURE SCREEN ONLY: CPT

## 2021-01-05 PROCEDURE — 85610 PROTHROMBIN TIME: CPT

## 2021-01-05 PROCEDURE — 85730 THROMBOPLASTIN TIME PARTIAL: CPT

## 2021-01-05 PROCEDURE — 80048 BASIC METABOLIC PNL TOTAL CA: CPT

## 2021-01-05 PROCEDURE — 93005 ELECTROCARDIOGRAM TRACING: CPT

## 2021-01-06 LAB — SARS-COV-2 RNA RESP QL NAA+PROBE: NOT DETECTED

## 2021-01-07 ENCOUNTER — HOSPITAL ENCOUNTER (OUTPATIENT)
Dept: CV DIAGNOSTICS | Facility: HOSPITAL | Age: 66
Discharge: HOME | End: 2021-01-07
Attending: FAMILY MEDICINE
Payer: COMMERCIAL

## 2021-01-07 DIAGNOSIS — R60.0 LOCALIZED EDEMA: ICD-10-CM

## 2021-01-07 DIAGNOSIS — Z01.810 PRE-OPERATIVE CARDIOVASCULAR EXAMINATION: ICD-10-CM

## 2021-01-07 PROCEDURE — 93306 TTE W/DOPPLER COMPLETE: CPT | Performed by: FAMILY MEDICINE

## 2021-01-07 RX ORDER — HYDROCODONE BITARTRATE AND ACETAMINOPHEN 10; 325 MG/1; MG/1
1-2 TABLET ORAL EVERY 6 HOURS PRN
Qty: 20 TABLET | Refills: 0 | Status: SHIPPED | OUTPATIENT
Start: 2021-01-08 | End: 2021-02-09

## 2021-01-08 ENCOUNTER — ANESTHESIA EVENT (OUTPATIENT)
Dept: SURGERY | Facility: HOSPITAL | Age: 66
End: 2021-01-08
Payer: COMMERCIAL

## 2021-01-08 ENCOUNTER — ANESTHESIA (OUTPATIENT)
Dept: SURGERY | Facility: HOSPITAL | Age: 66
End: 2021-01-08
Payer: COMMERCIAL

## 2021-01-08 ENCOUNTER — HOSPITAL ENCOUNTER (OUTPATIENT)
Facility: HOSPITAL | Age: 66
Setting detail: HOSPITAL OUTPATIENT SURGERY
Discharge: HOME | End: 2021-01-08
Attending: ORTHOPAEDIC SURGERY | Admitting: ORTHOPAEDIC SURGERY
Payer: COMMERCIAL

## 2021-01-08 VITALS
DIASTOLIC BLOOD PRESSURE: 82 MMHG | WEIGHT: 255.75 LBS | OXYGEN SATURATION: 97 % | HEIGHT: 72 IN | SYSTOLIC BLOOD PRESSURE: 133 MMHG | BODY MASS INDEX: 34.64 KG/M2 | HEART RATE: 78 BPM | RESPIRATION RATE: 16 BRPM | TEMPERATURE: 98 F

## 2021-01-08 DIAGNOSIS — S83.242A ACUTE MEDIAL MENISCUS TEAR OF LEFT KNEE, INITIAL ENCOUNTER: Primary | ICD-10-CM

## 2021-01-08 PROBLEM — Z47.89 ORTHOPEDIC AFTERCARE: Status: ACTIVE | Noted: 2021-01-08

## 2021-01-08 PROCEDURE — 0SBD4ZZ EXCISION OF LEFT KNEE JOINT, PERCUTANEOUS ENDOSCOPIC APPROACH: ICD-10-PCS | Performed by: ORTHOPAEDIC SURGERY

## 2021-01-08 RX ORDER — LABETALOL HYDROCHLORIDE 5 MG/ML
5 INJECTION, SOLUTION INTRAVENOUS EVERY 5 MIN PRN
Status: DISCONTINUED | OUTPATIENT
Start: 2021-01-08 | End: 2021-01-08

## 2021-01-08 RX ORDER — ONDANSETRON 2 MG/ML
INJECTION INTRAMUSCULAR; INTRAVENOUS AS NEEDED
Status: DISCONTINUED | OUTPATIENT
Start: 2021-01-08 | End: 2021-01-08 | Stop reason: SURG

## 2021-01-08 RX ORDER — HYDROMORPHONE HYDROCHLORIDE 1 MG/ML
0.4 INJECTION, SOLUTION INTRAMUSCULAR; INTRAVENOUS; SUBCUTANEOUS EVERY 5 MIN PRN
Status: DISCONTINUED | OUTPATIENT
Start: 2021-01-08 | End: 2021-01-08

## 2021-01-08 RX ORDER — BUPIVACAINE HYDROCHLORIDE AND EPINEPHRINE 5; 5 MG/ML; UG/ML
INJECTION, SOLUTION EPIDURAL; INTRACAUDAL; PERINEURAL AS NEEDED
Status: DISCONTINUED | OUTPATIENT
Start: 2021-01-08 | End: 2021-01-08 | Stop reason: HOSPADM

## 2021-01-08 RX ORDER — SODIUM CHLORIDE, SODIUM LACTATE, POTASSIUM CHLORIDE, CALCIUM CHLORIDE 600; 310; 30; 20 MG/100ML; MG/100ML; MG/100ML; MG/100ML
INJECTION, SOLUTION INTRAVENOUS CONTINUOUS
Status: DISCONTINUED | OUTPATIENT
Start: 2021-01-08 | End: 2021-01-08

## 2021-01-08 RX ORDER — ONDANSETRON 2 MG/ML
4 INJECTION INTRAMUSCULAR; INTRAVENOUS AS NEEDED
Status: DISCONTINUED | OUTPATIENT
Start: 2021-01-08 | End: 2021-01-08

## 2021-01-08 RX ORDER — NALOXONE HYDROCHLORIDE 0.4 MG/ML
80 INJECTION, SOLUTION INTRAMUSCULAR; INTRAVENOUS; SUBCUTANEOUS AS NEEDED
Status: DISCONTINUED | OUTPATIENT
Start: 2021-01-08 | End: 2021-01-08

## 2021-01-08 RX ORDER — ACETAMINOPHEN 500 MG
1000 TABLET ORAL EVERY 6 HOURS PRN
COMMUNITY

## 2021-01-08 RX ORDER — MEPERIDINE HYDROCHLORIDE 25 MG/ML
12.5 INJECTION INTRAMUSCULAR; INTRAVENOUS; SUBCUTANEOUS AS NEEDED
Status: DISCONTINUED | OUTPATIENT
Start: 2021-01-08 | End: 2021-01-08

## 2021-01-08 RX ORDER — LIDOCAINE HYDROCHLORIDE 10 MG/ML
INJECTION, SOLUTION EPIDURAL; INFILTRATION; INTRACAUDAL; PERINEURAL AS NEEDED
Status: DISCONTINUED | OUTPATIENT
Start: 2021-01-08 | End: 2021-01-08 | Stop reason: SURG

## 2021-01-08 RX ORDER — HYDROCODONE BITARTRATE AND ACETAMINOPHEN 5; 325 MG/1; MG/1
2 TABLET ORAL AS NEEDED
Status: COMPLETED | OUTPATIENT
Start: 2021-01-08 | End: 2021-01-08

## 2021-01-08 RX ORDER — DEXAMETHASONE SODIUM PHOSPHATE 4 MG/ML
VIAL (ML) INJECTION AS NEEDED
Status: DISCONTINUED | OUTPATIENT
Start: 2021-01-08 | End: 2021-01-08 | Stop reason: SURG

## 2021-01-08 RX ORDER — HYDROCODONE BITARTRATE AND ACETAMINOPHEN 5; 325 MG/1; MG/1
1 TABLET ORAL AS NEEDED
Status: COMPLETED | OUTPATIENT
Start: 2021-01-08 | End: 2021-01-08

## 2021-01-08 RX ORDER — KETOROLAC TROMETHAMINE 30 MG/ML
INJECTION, SOLUTION INTRAMUSCULAR; INTRAVENOUS AS NEEDED
Status: DISCONTINUED | OUTPATIENT
Start: 2021-01-08 | End: 2021-01-08 | Stop reason: SURG

## 2021-01-08 RX ORDER — METOCLOPRAMIDE HYDROCHLORIDE 5 MG/ML
INJECTION INTRAMUSCULAR; INTRAVENOUS AS NEEDED
Status: DISCONTINUED | OUTPATIENT
Start: 2021-01-08 | End: 2021-01-08 | Stop reason: SURG

## 2021-01-08 RX ORDER — CEFAZOLIN SODIUM/WATER 2 G/20 ML
2 SYRINGE (ML) INTRAVENOUS ONCE
Status: COMPLETED | OUTPATIENT
Start: 2021-01-08 | End: 2021-01-08

## 2021-01-08 RX ADMIN — ONDANSETRON 4 MG: 2 INJECTION INTRAMUSCULAR; INTRAVENOUS at 13:26:00

## 2021-01-08 RX ADMIN — SODIUM CHLORIDE, SODIUM LACTATE, POTASSIUM CHLORIDE, CALCIUM CHLORIDE: 600; 310; 30; 20 INJECTION, SOLUTION INTRAVENOUS at 13:35:00

## 2021-01-08 RX ADMIN — METOCLOPRAMIDE HYDROCHLORIDE 10 MG: 5 INJECTION INTRAMUSCULAR; INTRAVENOUS at 12:45:00

## 2021-01-08 RX ADMIN — DEXAMETHASONE SODIUM PHOSPHATE 8 MG: 4 MG/ML VIAL (ML) INJECTION at 12:45:00

## 2021-01-08 RX ADMIN — LIDOCAINE HYDROCHLORIDE 50 MG: 10 INJECTION, SOLUTION EPIDURAL; INFILTRATION; INTRACAUDAL; PERINEURAL at 12:43:00

## 2021-01-08 RX ADMIN — CEFAZOLIN SODIUM/WATER 2 G: 2 G/20 ML SYRINGE (ML) INTRAVENOUS at 12:45:00

## 2021-01-08 RX ADMIN — KETOROLAC TROMETHAMINE 30 MG: 30 INJECTION, SOLUTION INTRAMUSCULAR; INTRAVENOUS at 13:26:00

## 2021-01-08 NOTE — ANESTHESIA PREPROCEDURE EVALUATION
PRE-OP EVALUATION    Patient Name: Dharmesh Courtney    Pre-op Diagnosis: Acute medial meniscus tear of left knee, initial encounter [C87.072U]    Procedure(s):  LEFT KNEE ARTHROSCOPY WITH PARTIAL MEDIAL MENISCECTOMY      Surgeon(s) and Role:     * Prosper Gray Tobacco comment: smokes cigars a couple times a month    Alcohol use: Not Currently      Alcohol/week: 14.0 standard drinks      Types: 14 Standard drinks or equivalent per week      Drinks per session: 1 or 2      Comment: 2 drinks daily      Drug use

## 2021-01-08 NOTE — ANESTHESIA POSTPROCEDURE EVALUATION
[address] Patient Status:  Hospital Outpatient Surgery   Age/Gender 76year old male MRN [id number]   Location [address] [place] [address] Attending No att. providers found   Hosp Day # 0 PCP MD Tej Oropeza

## 2021-01-08 NOTE — BRIEF OP NOTE
Pre-Operative Diagnosis: Acute medial meniscus tear of left knee, initial encounter [S83.242A]     Post-Operative Diagnosis: Acute medial meniscus tear of left knee, initial encounter [K80.648D]      Procedure Performed:   Procedure(s):  LEFT KNEE [address]

## 2021-01-08 NOTE — INTERVAL H&P NOTE
Pre-op Diagnosis: Acute medial meniscus tear of left knee, initial encounter [S83.242A]    The above referenced H&P was reviewed by KRISTIN Charles on 9/0/7802, the patient was examined and no significant changes have occurred in the patient's condition

## 2021-01-08 NOTE — ANESTHESIA PROCEDURE NOTES
Airway  Date/Time: 1/8/2021 12:44 PM  Urgency: elective      General Information and Staff    Patient location during procedure: OR  Anesthesiologist: Sayra Hernandez MD  Performed: anesthesiologist     Indications and Patient Condition  Indications for airway

## 2021-01-08 NOTE — OR NURSING
INSTRUCTIONS GIVEN. IV REMOVED. SCRIPT SENT INTO PATIENT'S PHARMACY. QUESTIONS ADDRESSED. REQUESTING TO GO HOME. DENIES NAUSEA AND C/O MODERATE PAIN. SEE MAR.

## 2021-01-09 NOTE — OPERATIVE REPORT
[address]    PATIENT'S NAME: Kailee Tompkins   ATTENDING PHYSICIAN: Pearl Scott M.D. OPERATING PHYSICIAN: Pearl Scott M.D.    PATIENT ACCOUNT#:   [de-identified]    LOCATION:  [address]  MEDICAL RECORD #:   [id number]       JOHAN changes and this area was debrided with the arthroscopic shaver, removing loose pieces of cartilage. The medial compartment was then entered. A spinal needle was placed cephalad to the medial meniscus just medial to the patellar tendon.   Stab incision wa

## 2021-02-09 ENCOUNTER — HOSPITAL ENCOUNTER (INPATIENT)
Facility: HOSPITAL | Age: 66
LOS: 2 days | Discharge: HOME | DRG: 381 | End: 2021-02-11
Attending: EMERGENCY MEDICINE | Admitting: FAMILY MEDICINE
Payer: MEDICARE

## 2021-02-09 DIAGNOSIS — R10.9 INTRACTABLE ABDOMINAL PAIN: ICD-10-CM

## 2021-02-09 DIAGNOSIS — R19.7 INTRACTABLE DIARRHEA: Primary | ICD-10-CM

## 2021-02-09 PROBLEM — R73.9 HYPERGLYCEMIA: Status: ACTIVE | Noted: 2021-02-09

## 2021-02-09 LAB
ALBUMIN SERPL-MCNC: 3 G/DL (ref 3.4–5)
ALBUMIN/GLOB SERPL: 0.7 {RATIO} (ref 1–2)
ALP LIVER SERPL-CCNC: 84 U/L
ALT SERPL-CCNC: 24 U/L
ANION GAP SERPL CALC-SCNC: 5 MMOL/L (ref 0–18)
AST SERPL-CCNC: 10 U/L (ref 15–37)
BASOPHILS # BLD AUTO: 0.02 X10(3) UL (ref 0–0.2)
BASOPHILS NFR BLD AUTO: 0.2 %
BILIRUB SERPL-MCNC: 1 MG/DL (ref 0.1–2)
BUN BLD-MCNC: 13 MG/DL (ref 7–18)
BUN/CREAT SERPL: 15.3 (ref 10–20)
CALCIUM BLD-MCNC: 8.9 MG/DL (ref 8.5–10.1)
CHLORIDE SERPL-SCNC: 103 MMOL/L (ref 98–112)
CO2 SERPL-SCNC: 28 MMOL/L (ref 21–32)
CREAT BLD-MCNC: 0.85 MG/DL
DEPRECATED RDW RBC AUTO: 39.6 FL (ref 35.1–46.3)
EOSINOPHIL # BLD AUTO: 0.04 X10(3) UL (ref 0–0.7)
EOSINOPHIL NFR BLD AUTO: 0.5 %
ERYTHROCYTE [DISTWIDTH] IN BLOOD BY AUTOMATED COUNT: 12 % (ref 11–15)
GLOBULIN PLAS-MCNC: 4.1 G/DL (ref 2.8–4.4)
GLUCOSE BLD-MCNC: 102 MG/DL (ref 70–99)
HCT VFR BLD AUTO: 46 %
HGB BLD-MCNC: 15.4 G/DL
IMM GRANULOCYTES # BLD AUTO: 0.02 X10(3) UL (ref 0–1)
IMM GRANULOCYTES NFR BLD: 0.2 %
LYMPHOCYTES # BLD AUTO: 0.99 X10(3) UL (ref 1–4)
LYMPHOCYTES NFR BLD AUTO: 11.3 %
M PROTEIN MFR SERPL ELPH: 7.1 G/DL (ref 6.4–8.2)
MCH RBC QN AUTO: 30.4 PG (ref 26–34)
MCHC RBC AUTO-ENTMCNC: 33.5 G/DL (ref 31–37)
MCV RBC AUTO: 90.7 FL
MONOCYTES # BLD AUTO: 0.64 X10(3) UL (ref 0.1–1)
MONOCYTES NFR BLD AUTO: 7.3 %
NEUTROPHILS # BLD AUTO: 7.09 X10 (3) UL (ref 1.5–7.7)
NEUTROPHILS # BLD AUTO: 7.09 X10(3) UL (ref 1.5–7.7)
NEUTROPHILS NFR BLD AUTO: 80.5 %
OSMOLALITY SERPL CALC.SUM OF ELEC: 282 MOSM/KG (ref 275–295)
PLATELET # BLD AUTO: 270 10(3)UL (ref 150–450)
POTASSIUM SERPL-SCNC: 3.6 MMOL/L (ref 3.5–5.1)
RBC # BLD AUTO: 5.07 X10(6)UL
SARS-COV-2 RNA RESP QL NAA+PROBE: NOT DETECTED
SODIUM SERPL-SCNC: 136 MMOL/L (ref 136–145)
WBC # BLD AUTO: 8.8 X10(3) UL (ref 4–11)

## 2021-02-09 PROCEDURE — 96372 THER/PROPH/DIAG INJ SC/IM: CPT

## 2021-02-09 PROCEDURE — 99285 EMERGENCY DEPT VISIT HI MDM: CPT

## 2021-02-09 PROCEDURE — 85025 COMPLETE CBC W/AUTO DIFF WBC: CPT | Performed by: EMERGENCY MEDICINE

## 2021-02-09 PROCEDURE — 80053 COMPREHEN METABOLIC PANEL: CPT | Performed by: EMERGENCY MEDICINE

## 2021-02-09 PROCEDURE — 96361 HYDRATE IV INFUSION ADD-ON: CPT

## 2021-02-09 PROCEDURE — 96360 HYDRATION IV INFUSION INIT: CPT

## 2021-02-09 RX ORDER — DICYCLOMINE HYDROCHLORIDE 10 MG/ML
20 INJECTION INTRAMUSCULAR ONCE
Status: COMPLETED | OUTPATIENT
Start: 2021-02-09 | End: 2021-02-09

## 2021-02-09 RX ORDER — HYDROMORPHONE HYDROCHLORIDE 1 MG/ML
0.2 INJECTION, SOLUTION INTRAMUSCULAR; INTRAVENOUS; SUBCUTANEOUS EVERY 2 HOUR PRN
Status: DISCONTINUED | OUTPATIENT
Start: 2021-02-09 | End: 2021-02-11

## 2021-02-09 RX ORDER — TEMAZEPAM 15 MG/1
15 CAPSULE ORAL NIGHTLY PRN
Status: DISCONTINUED | OUTPATIENT
Start: 2021-02-09 | End: 2021-02-11

## 2021-02-09 RX ORDER — HYDROMORPHONE HYDROCHLORIDE 1 MG/ML
0.8 INJECTION, SOLUTION INTRAMUSCULAR; INTRAVENOUS; SUBCUTANEOUS EVERY 2 HOUR PRN
Status: DISCONTINUED | OUTPATIENT
Start: 2021-02-09 | End: 2021-02-11

## 2021-02-09 RX ORDER — SODIUM CHLORIDE 9 MG/ML
INJECTION, SOLUTION INTRAVENOUS CONTINUOUS
Status: ACTIVE | OUTPATIENT
Start: 2021-02-09 | End: 2021-02-09

## 2021-02-09 RX ORDER — HYDROMORPHONE HYDROCHLORIDE 1 MG/ML
0.4 INJECTION, SOLUTION INTRAMUSCULAR; INTRAVENOUS; SUBCUTANEOUS EVERY 2 HOUR PRN
Status: DISCONTINUED | OUTPATIENT
Start: 2021-02-09 | End: 2021-02-11

## 2021-02-09 RX ORDER — ACETAMINOPHEN 325 MG/1
650 TABLET ORAL EVERY 6 HOURS PRN
Status: DISCONTINUED | OUTPATIENT
Start: 2021-02-09 | End: 2021-02-11

## 2021-02-09 RX ORDER — ONDANSETRON 2 MG/ML
4 INJECTION INTRAMUSCULAR; INTRAVENOUS EVERY 6 HOURS PRN
Status: DISCONTINUED | OUTPATIENT
Start: 2021-02-09 | End: 2021-02-11

## 2021-02-09 RX ORDER — ONDANSETRON 2 MG/ML
4 INJECTION INTRAMUSCULAR; INTRAVENOUS EVERY 4 HOURS PRN
Status: DISCONTINUED | OUTPATIENT
Start: 2021-02-09 | End: 2021-02-09

## 2021-02-09 RX ORDER — ACYCLOVIR 400 MG/1
400 TABLET ORAL 2 TIMES DAILY
Status: DISCONTINUED | OUTPATIENT
Start: 2021-02-09 | End: 2021-02-10

## 2021-02-09 RX ORDER — SODIUM CHLORIDE 9 MG/ML
INJECTION, SOLUTION INTRAVENOUS CONTINUOUS
Status: DISCONTINUED | OUTPATIENT
Start: 2021-02-09 | End: 2021-02-11

## 2021-02-09 RX ORDER — METOCLOPRAMIDE HYDROCHLORIDE 5 MG/ML
10 INJECTION INTRAMUSCULAR; INTRAVENOUS EVERY 8 HOURS PRN
Status: DISCONTINUED | OUTPATIENT
Start: 2021-02-09 | End: 2021-02-11

## 2021-02-09 RX ORDER — ATORVASTATIN CALCIUM 40 MG/1
40 TABLET, FILM COATED ORAL DAILY
Status: DISCONTINUED | OUTPATIENT
Start: 2021-02-10 | End: 2021-02-11

## 2021-02-09 RX ORDER — MULTIVITAMIN WITH FOLIC ACID 400 MCG
1 TABLET ORAL DAILY
COMMUNITY

## 2021-02-09 RX ORDER — ENOXAPARIN SODIUM 100 MG/ML
40 INJECTION SUBCUTANEOUS DAILY
Status: DISCONTINUED | OUTPATIENT
Start: 2021-02-09 | End: 2021-02-11

## 2021-02-09 NOTE — ED PROVIDER NOTES
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

## 2021-02-09 NOTE — ED INITIAL ASSESSMENT (HPI)
Abdominal cramping and diarrhea x 3 weeks, states had a CT scan yesterday and was told to come to ER. 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

## 2021-02-10 LAB
ALBUMIN SERPL-MCNC: 2.3 G/DL (ref 3.4–5)
ALBUMIN/GLOB SERPL: 0.7 {RATIO} (ref 1–2)
ALP LIVER SERPL-CCNC: 66 U/L
ALT SERPL-CCNC: 16 U/L
ANION GAP SERPL CALC-SCNC: 6 MMOL/L (ref 0–18)
AST SERPL-CCNC: 12 U/L (ref 15–37)
BASOPHILS # BLD AUTO: 0.03 X10(3) UL (ref 0–0.2)
BASOPHILS NFR BLD AUTO: 0.5 %
BILIRUB SERPL-MCNC: 1 MG/DL (ref 0.1–2)
BUN BLD-MCNC: 11 MG/DL (ref 7–18)
BUN/CREAT SERPL: 16.2 (ref 10–20)
CALCIUM BLD-MCNC: 8.1 MG/DL (ref 8.5–10.1)
CHLORIDE SERPL-SCNC: 108 MMOL/L (ref 98–112)
CO2 SERPL-SCNC: 23 MMOL/L (ref 21–32)
CREAT BLD-MCNC: 0.68 MG/DL
DEPRECATED RDW RBC AUTO: 40.5 FL (ref 35.1–46.3)
EOSINOPHIL # BLD AUTO: 0.06 X10(3) UL (ref 0–0.7)
EOSINOPHIL NFR BLD AUTO: 0.9 %
ERYTHROCYTE [DISTWIDTH] IN BLOOD BY AUTOMATED COUNT: 11.9 % (ref 11–15)
GLOBULIN PLAS-MCNC: 3.4 G/DL (ref 2.8–4.4)
GLUCOSE BLD-MCNC: 83 MG/DL (ref 70–99)
HCT VFR BLD AUTO: 40.9 %
HGB BLD-MCNC: 13.7 G/DL
IMM GRANULOCYTES # BLD AUTO: 0.03 X10(3) UL (ref 0–1)
IMM GRANULOCYTES NFR BLD: 0.5 %
LYMPHOCYTES # BLD AUTO: 0.99 X10(3) UL (ref 1–4)
LYMPHOCYTES NFR BLD AUTO: 14.9 %
M PROTEIN MFR SERPL ELPH: 5.7 G/DL (ref 6.4–8.2)
MCH RBC QN AUTO: 30.9 PG (ref 26–34)
MCHC RBC AUTO-ENTMCNC: 33.5 G/DL (ref 31–37)
MCV RBC AUTO: 92.3 FL
MONOCYTES # BLD AUTO: 0.49 X10(3) UL (ref 0.1–1)
MONOCYTES NFR BLD AUTO: 7.4 %
NEUTROPHILS # BLD AUTO: 5.03 X10 (3) UL (ref 1.5–7.7)
NEUTROPHILS # BLD AUTO: 5.03 X10(3) UL (ref 1.5–7.7)
NEUTROPHILS NFR BLD AUTO: 75.8 %
OSMOLALITY SERPL CALC.SUM OF ELEC: 283 MOSM/KG (ref 275–295)
PLATELET # BLD AUTO: 235 10(3)UL (ref 150–450)
POTASSIUM SERPL-SCNC: 3.5 MMOL/L (ref 3.5–5.1)
RBC # BLD AUTO: 4.43 X10(6)UL
SODIUM SERPL-SCNC: 137 MMOL/L (ref 136–145)
WBC # BLD AUTO: 6.6 X10(3) UL (ref 4–11)

## 2021-02-10 PROCEDURE — 80053 COMPREHEN METABOLIC PANEL: CPT | Performed by: FAMILY MEDICINE

## 2021-02-10 PROCEDURE — 85025 COMPLETE CBC W/AUTO DIFF WBC: CPT | Performed by: FAMILY MEDICINE

## 2021-02-10 PROCEDURE — C9113 INJ PANTOPRAZOLE SODIUM, VIA: HCPCS | Performed by: FAMILY MEDICINE

## 2021-02-10 RX ORDER — AMLODIPINE BESYLATE 5 MG/1
5 TABLET ORAL DAILY
Status: DISCONTINUED | OUTPATIENT
Start: 2021-02-10 | End: 2021-02-11

## 2021-02-10 RX ORDER — VALACYCLOVIR HYDROCHLORIDE 500 MG/1
500 TABLET, FILM COATED ORAL EVERY OTHER DAY
Status: DISCONTINUED | OUTPATIENT
Start: 2021-02-11 | End: 2021-02-11

## 2021-02-10 NOTE — CONSULTS
GASTROENTEROLOGY CONSULTATION  Benedicto Shi MD    Department of Gastroenterology  [facility]    Rita Gravesbeatrice Patient Status:  Inpatient    1955 MRN [id number]   [facility] 3NE-A Attending Bob Daley MD   [facility] Day [facility]ERY      retinal reatttachment   • KNEE ARTHROSCOPY Left 1/8/2021    Performed by Yoel Chávez MD at [facility] MAIN OR   • PARS PLANA VITRECTOMY 25 GAUGE WITH ENDOLASER Left 4/18/2018    Performed by Nicholas Owens MD at [address] [place] [address]   • RIGHT fatigue, chills/fever, night sweats, weight loss, loss of appetite, weight gain, sleep disturbance. Cardiovascular: Denies history of heart murmur, chest pain or angina.   Respiratory: Denies shortness of breath, chronic/frequent hoarseness, wheezing,  3.6 02/09/2021     02/09/2021    CO2 28.0 02/09/2021     02/09/2021    CA 8.9 02/09/2021    ALB 3.0 02/09/2021    ALKPHO 84 02/09/2021    BILT 1.0 02/09/2021    TP 7.1 02/09/2021    AST 10 02/09/2021    ALT 24 02/09/2021     COLONOSCOPY REPORT [id number]  Collected:  8/29/2017 10:43 AM Status:  Final result   Visible to patient:  Yes (MyChart) Dx:  Family history of malignant neoplasm . ..   Component    Case Report   Surgical Pathology                                Case: [id number]                 cancer were reviewed with the patient. 2.  Continue protonix. 3.  Follow up on stool studies. 4.  Monitor labs. Cc:  No ref. provider found      Thank you for allowing to participate in the care of this patient.     Luke Mcclain

## 2021-02-10 NOTE — H&P
[facility]  History & Physical    Koleen Backer Patient Status:  Inpatient    1955 MRN [id number]   [facility] 3NE-A Attending Dwayne Loyd MD   Hosp Day # 1 PCP Sunshine Brantley MD     History of Present Illness:  Jethro Santiago Cannabis. Allergies:    Losartan                ANGIOEDEMA    Home Medications:    •  Multiple Vitamins-Minerals (TAB-A-ALEJANDRINA) Oral Tab, Take 1 tablet by mouth daily. , Disp: , Rfl: , 2/9/2021 at 0900    •  acetaminophen 500 MG Oral Tab, Take 1,000 mg by curvature, ROM normal, no CVA tenderness   Lungs:     Clear to auscultation bilaterally, respirations unlabored   Chest wall:    No tenderness or deformity   Heart:    Regular rate and rhythm, S1 and S2 normal, no murmur, rub   or gallop   Abdomen:     Sof

## 2021-02-10 NOTE — PLAN OF CARE
Problem: Patient/Family Goals  Goal: Patient/Family Long Term Goal  Description: Patient's Long Term Goal: discharge home    Interventions:  - See additional Care Plan goals for specific interventions  2/10/2021 0831 by Aubrie Leon RN  Outcome: Progr treat as appropriate  - Assist with meals as needed  - Monitor I&O, WT and lab values  - Obtain nutritional consult as needed  - Optimize oral hygiene and moisture  - Encourage food from home; allow for food preferences  - Enhance eating environment  2/10/

## 2021-02-10 NOTE — DIETARY NOTE
[facility]    NUTRITION ASSESSMENT    Pt does not meet malnutrition criteria.       NUTRITION DIAGNOSIS/PROBLEM:    Inadequate oral intake related to physiological causes as evidenced by estimated intake less than estimated needs; reported 16# wt loss (22-25 calories per kg/IBW)  Protein: 121-162 grams protein/day (1.2-1.5 grams protein per kg)  Fluid: ~1 ml/kcal or per MD discretion    MONITOR AND EVALUATE/NUTRITION GOALS:    3. No signs of skin breakdown  4.  Maintain lean body mass  PO within 48h    M

## 2021-02-10 NOTE — PLAN OF CARE
NURSING ADMISSION NOTE      Patient admitted via Cart  Oriented to room. Safety precautions initiated. Bed in low position. Call light in reach. Admission navigator complete. Patient A&Ox4. On RA. . Placed on telemetry. +1 edema to BLE noted.  Dec

## 2021-02-10 NOTE — PROGRESS NOTES
Patient is A&Ox4  Complained of abdominal pain, PRN pain med given  No n/v  Afebrile, VSS  IVF infusing  NSR on tele; on room air  Daily weights  Up ad jose  No issues or concerns at this time

## 2021-02-11 VITALS
SYSTOLIC BLOOD PRESSURE: 144 MMHG | DIASTOLIC BLOOD PRESSURE: 75 MMHG | HEIGHT: 72 IN | BODY MASS INDEX: 33.72 KG/M2 | WEIGHT: 249 LBS | RESPIRATION RATE: 16 BRPM | OXYGEN SATURATION: 97 % | HEART RATE: 83 BPM | TEMPERATURE: 97 F

## 2021-02-11 LAB
ALBUMIN SERPL-MCNC: 2.3 G/DL (ref 3.4–5)
ALBUMIN/GLOB SERPL: 0.7 {RATIO} (ref 1–2)
ALP LIVER SERPL-CCNC: 68 U/L
ALT SERPL-CCNC: 17 U/L
ANION GAP SERPL CALC-SCNC: 7 MMOL/L (ref 0–18)
AST SERPL-CCNC: 20 U/L (ref 15–37)
BILIRUB SERPL-MCNC: 0.9 MG/DL (ref 0.1–2)
BUN BLD-MCNC: 7 MG/DL (ref 7–18)
BUN/CREAT SERPL: 9.3 (ref 10–20)
CALCIUM BLD-MCNC: 8.3 MG/DL (ref 8.5–10.1)
CHLORIDE SERPL-SCNC: 108 MMOL/L (ref 98–112)
CO2 SERPL-SCNC: 27 MMOL/L (ref 21–32)
CREAT BLD-MCNC: 0.75 MG/DL
DEPRECATED RDW RBC AUTO: 40 FL (ref 35.1–46.3)
ERYTHROCYTE [DISTWIDTH] IN BLOOD BY AUTOMATED COUNT: 11.9 % (ref 11–15)
GLOBULIN PLAS-MCNC: 3.4 G/DL (ref 2.8–4.4)
GLUCOSE BLD-MCNC: 78 MG/DL (ref 70–99)
HCT VFR BLD AUTO: 40.7 %
HGB BLD-MCNC: 13.6 G/DL
M PROTEIN MFR SERPL ELPH: 5.7 G/DL (ref 6.4–8.2)
MCH RBC QN AUTO: 30.5 PG (ref 26–34)
MCHC RBC AUTO-ENTMCNC: 33.4 G/DL (ref 31–37)
MCV RBC AUTO: 91.3 FL
OSMOLALITY SERPL CALC.SUM OF ELEC: 291 MOSM/KG (ref 275–295)
PLATELET # BLD AUTO: 230 10(3)UL (ref 150–450)
POTASSIUM SERPL-SCNC: 4 MMOL/L (ref 3.5–5.1)
RBC # BLD AUTO: 4.46 X10(6)UL
SODIUM SERPL-SCNC: 142 MMOL/L (ref 136–145)
WBC # BLD AUTO: 4.8 X10(3) UL (ref 4–11)

## 2021-02-11 PROCEDURE — 80053 COMPREHEN METABOLIC PANEL: CPT | Performed by: FAMILY MEDICINE

## 2021-02-11 PROCEDURE — C9113 INJ PANTOPRAZOLE SODIUM, VIA: HCPCS | Performed by: FAMILY MEDICINE

## 2021-02-11 PROCEDURE — 0DBL8ZX EXCISION OF TRANSVERSE COLON, VIA NATURAL OR ARTIFICIAL OPENING ENDOSCOPIC, DIAGNOSTIC: ICD-10-PCS | Performed by: INTERNAL MEDICINE

## 2021-02-11 PROCEDURE — 99153 MOD SED SAME PHYS/QHP EA: CPT | Performed by: INTERNAL MEDICINE

## 2021-02-11 PROCEDURE — 88305 TISSUE EXAM BY PATHOLOGIST: CPT | Performed by: INTERNAL MEDICINE

## 2021-02-11 PROCEDURE — 99152 MOD SED SAME PHYS/QHP 5/>YRS: CPT | Performed by: INTERNAL MEDICINE

## 2021-02-11 PROCEDURE — 0DB68ZX EXCISION OF STOMACH, VIA NATURAL OR ARTIFICIAL OPENING ENDOSCOPIC, DIAGNOSTIC: ICD-10-PCS | Performed by: INTERNAL MEDICINE

## 2021-02-11 PROCEDURE — 0DBM8ZX EXCISION OF DESCENDING COLON, VIA NATURAL OR ARTIFICIAL OPENING ENDOSCOPIC, DIAGNOSTIC: ICD-10-PCS | Performed by: INTERNAL MEDICINE

## 2021-02-11 PROCEDURE — 0DB98ZX EXCISION OF DUODENUM, VIA NATURAL OR ARTIFICIAL OPENING ENDOSCOPIC, DIAGNOSTIC: ICD-10-PCS | Performed by: INTERNAL MEDICINE

## 2021-02-11 PROCEDURE — 85027 COMPLETE CBC AUTOMATED: CPT | Performed by: FAMILY MEDICINE

## 2021-02-11 RX ORDER — PANTOPRAZOLE SODIUM 40 MG/1
40 TABLET, DELAYED RELEASE ORAL
Status: DISCONTINUED | OUTPATIENT
Start: 2021-02-11 | End: 2021-02-11

## 2021-02-11 RX ORDER — MIDAZOLAM HYDROCHLORIDE 1 MG/ML
INJECTION INTRAMUSCULAR; INTRAVENOUS
Status: DISCONTINUED | OUTPATIENT
Start: 2021-02-11 | End: 2021-02-11 | Stop reason: HOSPADM

## 2021-02-11 RX ORDER — PANTOPRAZOLE SODIUM 40 MG/1
40 TABLET, DELAYED RELEASE ORAL
Qty: 60 TABLET | Refills: 2 | Status: SHIPPED | OUTPATIENT
Start: 2021-02-12

## 2021-02-11 NOTE — PLAN OF CARE
Alert x 4. Pleasant. On room air. On tele, NSR. On lovenox. Voiding. Continent. Loose stool - on bowel prep for EGD/scope in AM.  ABD pain - PRN's given. Up adlib.  NPO. Fluids running. Vitals stable.     Problem: Patient/Family Goals  Goal: Patient/F from home; allow for food preferences  - Enhance eating environment  Outcome: Progressing

## 2021-02-11 NOTE — PROGRESS NOTES
[facility]  Progress Note    Dharmesh Courtney Patient Status:  Inpatient    1955 MRN [id number]   [facility] 3NE-A Attending Vicki Gómez MD   Hosp Day # 1 PCP Areli Aquino MD       SUBJECTIVE:  No CP, SOB, or N/V.   Pain co 0.9% NaCl infusion, , Intravenous, Continuous    •  Enoxaparin Sodium (LOVENOX) 40 MG/0.4ML injection 40 mg, 40 mg, Subcutaneous, Daily    •  acetaminophen (TYLENOL) tab 650 mg, 650 mg, Oral, Q6H PRN    •  HYDROmorphone HCl (DILAUDID) 1 MG/ML injection 0.2

## 2021-02-11 NOTE — BRIEF OP NOTE
Pre-Operative Diagnosis: diarrhea, abdominal pain, weight loss     Post-Operative Diagnosis: esophagitis, hiatal hernia, gastritis, duodenal ulcer, colon polyps      Procedure Performed:   Procedure(s):  ESOPHAGOGASTRODUODENOSCOPY with biopsies  COLONOSCOP

## 2021-02-11 NOTE — PLAN OF CARE
Assumed care at 0730. Pt a/ox4, VSS, on RA, NSR on telemetry. Pt with no c/o pain, n/v, SOB, dizziness/lightheadedness. Finished golytely prep this AM. NPO for EGD/colonoscopy. Consent on chart. IVF running 0.9NS at 100mL/hr. Daily wt.  Pt taken down for pr consumed  - Identify factors contributing to decreased intake, treat as appropriate  - Assist with meals as needed  - Monitor I&O, WT and lab values  - Obtain nutritional consult as needed  - Optimize oral hygiene and moisture  - Encourage food from home;

## 2021-02-11 NOTE — OPERATIVE REPORT
PATIENT NAME: Sarah Amaral  MRN: [id number]  DATE OF OPERATION: 2/11/2021  REFERRING PHYSICIAN: Dr. Angie Colon  Medications:  Versed 14 mg IVP              Fentanyl 100 mcg IVP  DATE OF LAST COLONOSCOPY:  2017  PREOPERATIVE DIAGNOSIS:  Diarrhea  Abnormal ct of friability consistent with gastritis. The entire examined duodenum was visualized to the third portion and was remarkable for multiple 5 -7 cm duodenal ulcers from the bulb to third portion. Biopsies were done.   The gastroscope was removed from the patie

## 2021-02-12 NOTE — PROGRESS NOTES
[facility]  Progress Note    Riley Moulton Patient Status:  Inpatient    1955 MRN [id number]   [facility] 3NE-A Attending No att. providers found   [address] Day # 2 PCP Ezekiel Cuellar MD       SUBJECTIVE:  No CP, SOB, or N/V.   Impr observe over night but patient insistant on going home   Will dc home and follow up in office 2 weeks       Dossie Downer  2/12/2021  10:31 AM

## 2021-02-12 NOTE — PROGRESS NOTES
NURSING DISCHARGE NOTE    Discharged Home via Ambulatory. Accompanied by Family member  Belongings Taken by patient/family. Pt discharged home. Discharge paperwork given to pt.  Pt understands the importance of f/u and taking medications as prescrib

## 2021-03-04 DIAGNOSIS — Z23 NEED FOR VACCINATION: ICD-10-CM

## 2021-03-04 NOTE — DISCHARGE SUMMARY
[facility]  Discharge Summary    Deborah Banuelos Patient Status:  Inpatient    1955 MRN [id number]   [facility] 3NE-A Attending No att. providers found   [facility] Day # 2 PCP Andrews Suero MD     Date of Admission: 2021  2:4 daily, Historical    amLODIPine Besylate 5 MG Oral Tab  Take 7.5 mg by mouth daily. , Historical    Atorvastatin Calcium 40 MG Oral Tab  Take 40 mg by mouth daily. , Historical    valACYclovir HCl 1 G Oral Tab  Take 1,000 mg by mouth daily.   , Historical

## 2021-04-14 PROCEDURE — 88305 TISSUE EXAM BY PATHOLOGIST: CPT | Performed by: INTERNAL MEDICINE

## 2022-10-25 ENCOUNTER — TELEPHONE (OUTPATIENT)
Dept: SCHEDULING | Age: 67
End: 2022-10-25

## 2022-10-26 ENCOUNTER — OFFICE VISIT (OUTPATIENT)
Dept: URGENT CARE | Age: 67
End: 2022-10-26

## 2022-10-26 VITALS
BODY MASS INDEX: 35.21 KG/M2 | RESPIRATION RATE: 18 BRPM | SYSTOLIC BLOOD PRESSURE: 132 MMHG | OXYGEN SATURATION: 97 % | TEMPERATURE: 97.5 F | HEART RATE: 78 BPM | WEIGHT: 260 LBS | HEIGHT: 72 IN | DIASTOLIC BLOOD PRESSURE: 80 MMHG

## 2022-10-26 DIAGNOSIS — J00 ACUTE RHINITIS: ICD-10-CM

## 2022-10-26 DIAGNOSIS — H65.93 FLUID LEVEL BEHIND TYMPANIC MEMBRANE OF BOTH EARS: ICD-10-CM

## 2022-10-26 DIAGNOSIS — H91.90 DECREASED HEARING, UNSPECIFIED LATERALITY: Primary | ICD-10-CM

## 2022-10-26 PROCEDURE — 99203 OFFICE O/P NEW LOW 30 MIN: CPT | Performed by: NURSE PRACTITIONER

## 2022-10-26 RX ORDER — FLUTICASONE PROPIONATE 50 MCG
2 SPRAY, SUSPENSION (ML) NASAL DAILY
Qty: 16 G | Refills: 1 | Status: SHIPPED | OUTPATIENT
Start: 2022-10-26 | End: 2022-11-02

## 2022-10-26 ASSESSMENT — ENCOUNTER SYMPTOMS
EYES NEGATIVE: 1
SORE THROAT: 0
ABDOMINAL PAIN: 0
EYE REDNESS: 0
DIZZINESS: 0
FEVER: 0
SLEEP DISTURBANCE: 0
RHINORRHEA: 1
VOICE CHANGE: 0
LIGHT-HEADEDNESS: 0
COUGH: 0
FATIGUE: 0
FACIAL SWELLING: 0
APPETITE CHANGE: 0
SINUS PAIN: 0
CHEST TIGHTNESS: 0
ALLERGIC/IMMUNOLOGIC NEGATIVE: 1
SHORTNESS OF BREATH: 0
DIAPHORESIS: 0
HEADACHES: 0
WHEEZING: 0
WEAKNESS: 0
RESPIRATORY NEGATIVE: 1
SINUS PRESSURE: 0
CONSTITUTIONAL NEGATIVE: 1
EYE ITCHING: 0
CHILLS: 0

## 2023-02-01 ENCOUNTER — OFFICE VISIT (OUTPATIENT)
Dept: HEMATOLOGY/ONCOLOGY | Facility: HOSPITAL | Age: 68
End: 2023-02-01
Attending: INTERNAL MEDICINE
Payer: COMMERCIAL

## 2023-02-01 VITALS
SYSTOLIC BLOOD PRESSURE: 149 MMHG | HEART RATE: 77 BPM | OXYGEN SATURATION: 96 % | BODY MASS INDEX: 35 KG/M2 | RESPIRATION RATE: 18 BRPM | WEIGHT: 258 LBS | DIASTOLIC BLOOD PRESSURE: 76 MMHG | TEMPERATURE: 97 F

## 2023-02-01 DIAGNOSIS — K63.89 MESENTERIC MASS: Primary | ICD-10-CM

## 2023-02-01 DIAGNOSIS — E16.4 ELEVATED GASTRIN LEVEL: ICD-10-CM

## 2023-02-01 PROCEDURE — 99205 OFFICE O/P NEW HI 60 MIN: CPT | Performed by: INTERNAL MEDICINE

## 2023-02-01 RX ORDER — VALACYCLOVIR HYDROCHLORIDE 1 G/1
TABLET, FILM COATED ORAL AS NEEDED
COMMUNITY
Start: 2022-11-08

## 2023-02-01 NOTE — PROGRESS NOTES
Education Record    Learner:  Patient    Disease / Diagnosis: abn findings on CT    Barriers / Limitations:  None   Comments:    Method:  Discussion   Comments:    General Topics:  Plan of care reviewed   Comments:    Outcome:  Shows understanding   Comments: Consult referred by GI. Pt states he was hospitalized 2 years about with abd, diarrhea and inability to eat. Pt states he had CT, EGD/colonoscopy, and IV Protonix. Pt states he had repeat CT 6 months later and hazing was gone. Pt states he went down to only having diarrhea 1 a month. Pt states he followed up with GI last December and had blood work with repeat CT. States CT showed hazing again with enlarged LN. Pt states he is not having any pain or diarrhea symptoms like before. CT disk uploaded.

## 2023-02-02 PROBLEM — K63.89 MESENTERIC MASS: Status: ACTIVE | Noted: 2023-02-02

## 2023-02-02 PROBLEM — E16.4 ELEVATED GASTRIN LEVEL: Status: ACTIVE | Noted: 2023-02-02

## 2023-02-02 PROBLEM — E16.4: Status: ACTIVE | Noted: 2023-02-02

## 2023-02-09 ENCOUNTER — HOSPITAL ENCOUNTER (OUTPATIENT)
Dept: NUCLEAR MEDICINE | Facility: HOSPITAL | Age: 68
Discharge: HOME | End: 2023-02-09
Attending: INTERNAL MEDICINE
Payer: COMMERCIAL

## 2023-02-09 DIAGNOSIS — E16.4 ELEVATED GASTRIN LEVEL: ICD-10-CM

## 2023-02-09 DIAGNOSIS — K63.89 MESENTERIC MASS: ICD-10-CM

## 2023-02-09 PROCEDURE — 78815 PET IMAGE W/CT SKULL-THIGH: CPT | Performed by: INTERNAL MEDICINE

## 2023-02-13 ENCOUNTER — APPOINTMENT (OUTPATIENT)
Dept: HEMATOLOGY/ONCOLOGY | Facility: HOSPITAL | Age: 68
End: 2023-02-13
Attending: INTERNAL MEDICINE
Payer: COMMERCIAL

## 2023-02-15 ENCOUNTER — OFFICE VISIT (OUTPATIENT)
Dept: HEMATOLOGY/ONCOLOGY | Facility: HOSPITAL | Age: 68
End: 2023-02-15
Attending: INTERNAL MEDICINE
Payer: COMMERCIAL

## 2023-02-15 VITALS
RESPIRATION RATE: 18 BRPM | WEIGHT: 261 LBS | HEART RATE: 81 BPM | OXYGEN SATURATION: 97 % | BODY MASS INDEX: 35 KG/M2 | DIASTOLIC BLOOD PRESSURE: 81 MMHG | TEMPERATURE: 98 F | SYSTOLIC BLOOD PRESSURE: 162 MMHG

## 2023-02-15 DIAGNOSIS — E16.4 ELEVATED GASTRIN LEVEL: ICD-10-CM

## 2023-02-15 DIAGNOSIS — R59.0 RETROPERITONEAL LYMPHADENOPATHY: ICD-10-CM

## 2023-02-15 DIAGNOSIS — K63.89 MESENTERIC MASS: Primary | ICD-10-CM

## 2023-02-15 PROCEDURE — 99417 PROLNG OP E/M EACH 15 MIN: CPT | Performed by: INTERNAL MEDICINE

## 2023-02-15 PROCEDURE — 99215 OFFICE O/P EST HI 40 MIN: CPT | Performed by: INTERNAL MEDICINE

## 2023-02-15 NOTE — PROGRESS NOTES
Education Record    Learner:  Patient    Disease / Diagnosis:    Barriers / Limitations:  None   Comments:    Method:  Discussion   Comments:    General Topics:  Plan of care reviewed   Comments:    Outcome:  Shows understanding   Comments: MD f/up to discuss PET scan. Pt states he is feeling well. No updates since LOV.

## 2023-02-23 RX ORDER — AMOXICILLIN 875 MG/1
875 TABLET, COATED ORAL 2 TIMES DAILY
COMMUNITY

## 2023-02-25 ENCOUNTER — LAB ENCOUNTER (OUTPATIENT)
Dept: LAB | Age: 68
End: 2023-02-25
Attending: INTERNAL MEDICINE
Payer: COMMERCIAL

## 2023-02-25 DIAGNOSIS — Z01.818 PRE-OP TESTING: ICD-10-CM

## 2023-02-26 LAB — SARS-COV-2 RNA RESP QL NAA+PROBE: NOT DETECTED

## 2023-02-27 ENCOUNTER — ANESTHESIA EVENT (OUTPATIENT)
Dept: ENDOSCOPY | Facility: HOSPITAL | Age: 68
End: 2023-02-27
Payer: COMMERCIAL

## 2023-02-27 ENCOUNTER — ANESTHESIA (OUTPATIENT)
Dept: ENDOSCOPY | Facility: HOSPITAL | Age: 68
End: 2023-02-27
Payer: COMMERCIAL

## 2023-02-27 ENCOUNTER — HOSPITAL ENCOUNTER (OUTPATIENT)
Facility: HOSPITAL | Age: 68
Setting detail: HOSPITAL OUTPATIENT SURGERY
Discharge: HOME | End: 2023-02-27
Attending: INTERNAL MEDICINE | Admitting: INTERNAL MEDICINE
Payer: COMMERCIAL

## 2023-02-27 VITALS
HEART RATE: 63 BPM | TEMPERATURE: 98 F | WEIGHT: 260 LBS | OXYGEN SATURATION: 94 % | BODY MASS INDEX: 35.21 KG/M2 | DIASTOLIC BLOOD PRESSURE: 69 MMHG | RESPIRATION RATE: 20 BRPM | SYSTOLIC BLOOD PRESSURE: 91 MMHG | HEIGHT: 72 IN

## 2023-02-27 DIAGNOSIS — Z01.818 PRE-OP TESTING: Primary | ICD-10-CM

## 2023-02-27 PROCEDURE — 88342 IMHCHEM/IMCYTCHM 1ST ANTB: CPT | Performed by: INTERNAL MEDICINE

## 2023-02-27 PROCEDURE — 0FD24ZX EXTRACTION OF LEFT LOBE LIVER, PERCUTANEOUS ENDOSCOPIC APPROACH, DIAGNOSTIC: ICD-10-PCS | Performed by: INTERNAL MEDICINE

## 2023-02-27 PROCEDURE — 0DD78ZX EXTRACTION OF STOMACH, PYLORUS, VIA NATURAL OR ARTIFICIAL OPENING ENDOSCOPIC, DIAGNOSTIC: ICD-10-PCS | Performed by: INTERNAL MEDICINE

## 2023-02-27 PROCEDURE — 88341 IMHCHEM/IMCYTCHM EA ADD ANTB: CPT | Performed by: INTERNAL MEDICINE

## 2023-02-27 PROCEDURE — 88305 TISSUE EXAM BY PATHOLOGIST: CPT | Performed by: INTERNAL MEDICINE

## 2023-02-27 PROCEDURE — 88173 CYTOPATH EVAL FNA REPORT: CPT | Performed by: INTERNAL MEDICINE

## 2023-02-27 PROCEDURE — 88172 CYTP DX EVAL FNA 1ST EA SITE: CPT | Performed by: INTERNAL MEDICINE

## 2023-02-27 PROCEDURE — 0DD98ZX EXTRACTION OF DUODENUM, VIA NATURAL OR ARTIFICIAL OPENING ENDOSCOPIC, DIAGNOSTIC: ICD-10-PCS | Performed by: INTERNAL MEDICINE

## 2023-02-27 RX ORDER — SODIUM CHLORIDE, SODIUM LACTATE, POTASSIUM CHLORIDE, CALCIUM CHLORIDE 600; 310; 30; 20 MG/100ML; MG/100ML; MG/100ML; MG/100ML
INJECTION, SOLUTION INTRAVENOUS CONTINUOUS
Status: DISCONTINUED | OUTPATIENT
Start: 2023-02-27 | End: 2023-02-27

## 2023-02-27 RX ADMIN — SODIUM CHLORIDE, SODIUM LACTATE, POTASSIUM CHLORIDE, CALCIUM CHLORIDE: 600; 310; 30; 20 INJECTION, SOLUTION INTRAVENOUS at 11:03:00

## 2023-02-27 RX ADMIN — SODIUM CHLORIDE, SODIUM LACTATE, POTASSIUM CHLORIDE, CALCIUM CHLORIDE: 600; 310; 30; 20 INJECTION, SOLUTION INTRAVENOUS at 11:43:00

## 2023-02-27 NOTE — OPERATIVE REPORT
[address] OPERATIVE REPORT   PATIENT NAME: Tamra Hernández  MRN: [id number]  DATE OF OPERATION: 2/27/2023  PREOPERATIVE DIAGNOSIS: abdominal pain, mesenteric stranding and adenopathy; retroperitoneal nodes; elevated chromogranin A and abnormal PET dotatate suggestive of metastatic neuroendocrine tumor (NET) without obvious primary  POSTOPERATIVE DIAGNOSIS:    1. EGD    - Small hiatal hernia    - Mild antral gastritis    - 1cm umbilicated subepithelial duodenal mass suggestive of neuroendocrine tumor   2  EUS    - 1cm subepithelial hypoechoic mass in duodenum within the 2nd echogenic layer    - multiple retroperitoneal masses (vascular on doppler) around the portal vein, pancreas    - mild chronic pancreatitis changes  PROCEDURE PERFORMED: upper endoscopy/ ENDOSCOPIC ultrasound  SEDATION MEDICATIONS: MAC  PREOPERATIVE MEDICATIONS:   PREPROCEDURE ASSESSMENT: The indication for this procedure is to assess for NET. The patient was identified by myself and nursing staff in the exam room. Informed consent was obtained. The patient was seen in clinic and a full H&P was obtained. On brief physical examination, airway is patent. Chest is clear. Heart has regular rate and rhythm. Abdomen is soft, nontender with good bowel sounds. A medication list was taken by nursing today and reviewed by myself. The patient is an ASA grade 2. PROCEDURE NOTE: The procedure was completed without difficulty. The patient tolerated the procedure well. Upper endoscopy (EGD): The endoscope was inserted through the mouth and advanced to the level of the duodenum, 3rd portion. Visualized portion of the esophagus, stomach including antrum, body, fundus and cardiac, and duodenum were normal except for a small hiatal hernia and gastritis in antrum. Multiple biopsies in the antrum and body were taken. Within the second portion of the duodenum adjacent to the ampulla, a 1 cm smooth appearing subepithelial mass was seen.   It was firm on palpation with a forcep. Multiple stacked biopsies were taken. This exposed a yellow pigmented submucosal layer. No other lesions were seen in the duodenum. There was mild duodenitis seen as well. Endoscopic ultrasound (EUS):  Endoscopic ultrasound was performed using the linear echoendoscope. Images were obtained. LIVER: Left lobe of the liver was visualized and no mass or lesions seen. No intrahepatic duct dilation was noted. Portal vein was noted to come out of the liver and the portal confluence was seen and pancreas was noted except for very small 4 mm hyperechoic lesions which could be hemangiomas. PANCREAS:  Pancreatic neck, body, and tail were interrogated. They were without any evidence of neoplasms or cysts. There were changes of chronic pancreatitis seen-specifically lobulation with hyperechoic foci's and stranding were noted. No pancreatic ductal changes or stones were seen. Pancreatic duct was normal throughout. pancreatic duct was seen and measured 1.4 mm in the neck and was seen going toward the head of pancreas. The pancreatic tail was seen next to the left kidney and spleen. The scope was advanced into the duodenal bulb and the pancreatic head was interrogated next. The pancreatic duct in the head of pancreas was normal measuring 2.6 mm. It was seen going from the ampulla to the body of the pancreas around the confluence excluding the possibility of pancreas divisum. The head of pancreas was normal without mass or cyst.  Scope was advanced to the 2nd duodenum. The Aorta and IVC were visualized. The pancreatic uncinate was visualized and was normal.  Both CBD and PD were seen emerging and traversing the darker ventral enlage. PD was here seen going to the body of the pancreas once again ruling out pancreas divisum. Retroperitoneum: Multiple masses were seen adjacent to the pancreas and portal vein. The largest measured 2 cm. There were at least 4 that were seen.   They were hypoechoic. There were very vascular on color-flow Doppler. The least vascular mass was the largest mass. Using color-flow Doppler 2 passes were made using a 22-gauge FNB needle for histology. Onsite pathology suggested atypical cells. No significant retroperitoneal bleeding was seen on EUS after FNB. COMMON BILE DUCT: CBD was interrogated from the duodenal bulb. .  It was normal caliber measuring 4 mm and was traced from the liver hilum down to the ampulla. No stones or sludge was seen. GALLBLADDER: not visualized   CELIAC AXIS:  visualized without lymphadenopathy  L ADRENAL GLAND:  visualized   L KIDNEY:  visualized   MEDIASTINUM:  visualized without lymphadenopathy  Scope was withdrawn from the patient and patient tolerated the procedure well. FINDINGS   1. 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 Multiple biopsies were taken from all sites. Patient will follow-up with Dr. Daniela Modi. RECOMMENDATIONS:   DISCHARGE:  On discharge, the patient was given an after-visit summary detailing the procedure, findings, followup plans, and an updated medication list.     Thank you very much for the consultation. I really appreciate it.     Inga Parisi MD

## 2023-02-27 NOTE — DISCHARGE INSTRUCTIONS
Home Care Instructions Gastroscopy and EUS with Sedation    Diet:  - Resume your regular diet as tolerated unless otherwise instructed. - Start with light meals to minimize bloating.  - Do not drink alcohol today. Medication:  - If you have questions about resuming your normal medications, please contact your Primary Care Physician. Activities:  - Take it easy today. Do not return to work today. - Do not drive today. - Do not operate any machinery today (including kitchen equipment). Gastroscopy and EUS:  - You may have a sore throat for 2-3 days following the exam. This is normal. Gargling with warm salt water (1/2 tsp salt to 1 glass warm water) or using throat lozenges will help. - If you experience any sharp pain in your neck, abdomen or chest, vomiting of blood, oral temperature over 100 degrees Fahrenheit, light-headedness or dizziness, or any other problems, contact your doctor. **If unable to reach your doctor, please go to the [facility] Emergency Room**    - Your referring physician will receive a full report of your examination.  - If you do not hear from your doctor's office within two weeks of your biopsy, please call them for your results.

## 2023-03-06 ENCOUNTER — TELEPHONE (OUTPATIENT)
Dept: HEMATOLOGY/ONCOLOGY | Facility: HOSPITAL | Age: 68
End: 2023-03-06

## 2023-03-06 DIAGNOSIS — D3A.8 NEUROENDOCRINE NEOPLASM OF DUODENUM: Primary | ICD-10-CM

## 2023-03-06 NOTE — TELEPHONE ENCOUNTER
Spoke to patient regarding biopsy results confirming neuroendocrine cancer. Plan for CT pancreas protocol to assess for resectability. CT scheduled for 3/7/2023. Will present patient at GI multidisciplinary tumor board on 3/8/2023.

## 2023-03-07 ENCOUNTER — HOSPITAL ENCOUNTER (OUTPATIENT)
Dept: CT IMAGING | Facility: HOSPITAL | Age: 68
Discharge: HOME | End: 2023-03-07
Attending: NURSE PRACTITIONER
Payer: COMMERCIAL

## 2023-03-07 DIAGNOSIS — D3A.8 NEUROENDOCRINE NEOPLASM OF DUODENUM: ICD-10-CM

## 2023-03-07 LAB
CREAT BLD-MCNC: 1 MG/DL
GFR SERPLBLD BASED ON 1.73 SQ M-ARVRAT: 82 ML/MIN/1.73M2 (ref 60–?)

## 2023-03-07 PROCEDURE — 82565 ASSAY OF CREATININE: CPT

## 2023-03-07 PROCEDURE — 74170 CT ABD WO CNTRST FLWD CNTRST: CPT | Performed by: NURSE PRACTITIONER

## 2023-03-09 ENCOUNTER — TELEPHONE (OUTPATIENT)
Dept: HEMATOLOGY/ONCOLOGY | Facility: HOSPITAL | Age: 68
End: 2023-03-09

## 2023-03-15 ENCOUNTER — OFFICE VISIT (OUTPATIENT)
Dept: HEMATOLOGY/ONCOLOGY | Facility: HOSPITAL | Age: 68
End: 2023-03-15
Attending: INTERNAL MEDICINE
Payer: COMMERCIAL

## 2023-03-15 VITALS
OXYGEN SATURATION: 98 % | TEMPERATURE: 97 F | HEART RATE: 74 BPM | RESPIRATION RATE: 18 BRPM | DIASTOLIC BLOOD PRESSURE: 89 MMHG | BODY MASS INDEX: 35 KG/M2 | WEIGHT: 256.13 LBS | SYSTOLIC BLOOD PRESSURE: 163 MMHG

## 2023-03-15 DIAGNOSIS — E16.4 ZOLLINGER-ELLISON SYNDROME: ICD-10-CM

## 2023-03-15 DIAGNOSIS — C7B.8 METASTATIC MALIGNANT NEUROENDOCRINE TUMOR TO LYMPH NODE (HCC): Primary | ICD-10-CM

## 2023-03-15 DIAGNOSIS — C7A.8 PRIMARY MALIGNANT NEUROENDOCRINE TUMOR OF DUODENUM (HCC): ICD-10-CM

## 2023-03-15 PROBLEM — K63.89 MESENTERIC MASS: Status: RESOLVED | Noted: 2023-02-02 | Resolved: 2023-03-15

## 2023-03-15 PROCEDURE — 99215 OFFICE O/P EST HI 40 MIN: CPT | Performed by: INTERNAL MEDICINE

## 2023-03-15 PROCEDURE — 99417 PROLNG OP E/M EACH 15 MIN: CPT | Performed by: INTERNAL MEDICINE

## 2023-03-21 ENCOUNTER — DOCUMENTATION ONLY (OUTPATIENT)
Dept: HEMATOLOGY/ONCOLOGY | Facility: HOSPITAL | Age: 68
End: 2023-03-21

## 2023-03-24 ENCOUNTER — LAB ENCOUNTER (OUTPATIENT)
Dept: LAB | Age: 68
End: 2023-03-24
Attending: INTERNAL MEDICINE
Payer: COMMERCIAL

## 2023-03-24 DIAGNOSIS — C7A.8 PRIMARY MALIGNANT NEUROENDOCRINE TUMOR OF DUODENUM (HCC): ICD-10-CM

## 2023-03-24 DIAGNOSIS — E16.4 ZOLLINGER-ELLISON SYNDROME: ICD-10-CM

## 2023-03-24 DIAGNOSIS — C7B.8 METASTATIC MALIGNANT NEUROENDOCRINE TUMOR TO LYMPH NODE (HCC): ICD-10-CM

## 2023-03-24 LAB
CALCIUM BLD-MCNC: 9 MG/DL (ref 8.5–10.1)
CREAT BLD-MCNC: 0.96 MG/DL
PHOSPHATE SERPL-MCNC: 2.8 MG/DL (ref 2.5–4.9)
PROLACTIN SERPL-MCNC: 10.7 NG/ML
PTH-INTACT SERPL-MCNC: 48.1 PG/ML (ref 18.5–88)

## 2023-03-24 PROCEDURE — 84100 ASSAY OF PHOSPHORUS: CPT

## 2023-03-24 PROCEDURE — 84146 ASSAY OF PROLACTIN: CPT

## 2023-03-24 PROCEDURE — 82310 ASSAY OF CALCIUM: CPT

## 2023-03-24 PROCEDURE — 82565 ASSAY OF CREATININE: CPT

## 2023-03-24 PROCEDURE — 82330 ASSAY OF CALCIUM: CPT

## 2023-03-24 PROCEDURE — 83970 ASSAY OF PARATHORMONE: CPT

## 2023-03-26 LAB
CALCIUM IONIZED PH 7.4: 1.23 MMOL/L
CALCIUM IONIZED, SERUM: 1.24 MMOL/L

## 2023-04-05 ENCOUNTER — TELEPHONE (OUTPATIENT)
Dept: HEMATOLOGY/ONCOLOGY | Facility: HOSPITAL | Age: 68
End: 2023-04-05

## 2023-04-05 NOTE — TELEPHONE ENCOUNTER
Patient has a Video appt for tomorrow at 1pm at the [facility]. They need most recent scans Wife says dully sent to [address]. Wife is willing to go . Needs for tomorrows appt. Please call wife to notify when she can go  a copy of scans.

## 2023-04-05 NOTE — TELEPHONE ENCOUNTER
Spoke with wife. She meant the imaging on disks that were done here at [place] [address]. Phone number given to wife to call radiology for disk to be made. She v/u.

## 2023-04-06 ENCOUNTER — TELEPHONE (OUTPATIENT)
Dept: HEMATOLOGY/ONCOLOGY | Facility: HOSPITAL | Age: 68
End: 2023-04-06

## 2023-04-06 NOTE — TELEPHONE ENCOUNTER
Kevin Andrade is calling about OCTREOTIDE INJECTION would like to have this done. Kevin Andrade would like a call back to talk about this injection and how to get started.  Thanks Martin meier

## 2023-04-17 ENCOUNTER — OFFICE VISIT (OUTPATIENT)
Dept: HEMATOLOGY/ONCOLOGY | Facility: HOSPITAL | Age: 68
End: 2023-04-17
Attending: GENETIC COUNSELOR, MS
Payer: COMMERCIAL

## 2023-04-17 ENCOUNTER — TELEPHONE (OUTPATIENT)
Dept: HEMATOLOGY/ONCOLOGY | Facility: HOSPITAL | Age: 68
End: 2023-04-17

## 2023-04-17 ENCOUNTER — GENETICS ENCOUNTER (OUTPATIENT)
Dept: GENETICS | Facility: HOSPITAL | Age: 68
End: 2023-04-17
Attending: GENETIC COUNSELOR, MS
Payer: COMMERCIAL

## 2023-04-17 VITALS
DIASTOLIC BLOOD PRESSURE: 78 MMHG | OXYGEN SATURATION: 98 % | BODY MASS INDEX: 35 KG/M2 | HEART RATE: 72 BPM | SYSTOLIC BLOOD PRESSURE: 152 MMHG | TEMPERATURE: 98 F | RESPIRATION RATE: 18 BRPM | WEIGHT: 254.63 LBS

## 2023-04-17 DIAGNOSIS — C7A.8 PRIMARY MALIGNANT NEUROENDOCRINE TUMOR OF DUODENUM (HCC): ICD-10-CM

## 2023-04-17 DIAGNOSIS — C7B.8 METASTATIC MALIGNANT NEUROENDOCRINE TUMOR TO LYMPH NODE (HCC): ICD-10-CM

## 2023-04-17 DIAGNOSIS — E16.4 ZOLLINGER-ELLISON SYNDROME: Primary | ICD-10-CM

## 2023-04-17 DIAGNOSIS — C7B.8 METASTATIC MALIGNANT NEUROENDOCRINE TUMOR TO LYMPH NODE (HCC): Primary | ICD-10-CM

## 2023-04-17 DIAGNOSIS — C7A.8 PRIMARY MALIGNANT NEUROENDOCRINE TUMOR OF DUODENUM (HCC): Primary | ICD-10-CM

## 2023-04-17 DIAGNOSIS — E16.4 ZOLLINGER-ELLISON SYNDROME: ICD-10-CM

## 2023-04-17 DIAGNOSIS — Z80.0 FAMILY HISTORY OF COLON CANCER: ICD-10-CM

## 2023-04-17 DIAGNOSIS — Z80.9 FAMILY HISTORY OF CANCER: ICD-10-CM

## 2023-04-17 LAB
ALBUMIN SERPL-MCNC: 3.1 G/DL (ref 3.4–5)
ALBUMIN/GLOB SERPL: 0.9 {RATIO} (ref 1–2)
ALP LIVER SERPL-CCNC: 86 U/L
ALT SERPL-CCNC: 33 U/L
ANION GAP SERPL CALC-SCNC: 0 MMOL/L (ref 0–18)
AST SERPL-CCNC: 14 U/L (ref 15–37)
BASOPHILS # BLD AUTO: 0.03 X10(3) UL (ref 0–0.2)
BASOPHILS NFR BLD AUTO: 0.8 %
BILIRUB SERPL-MCNC: 2.1 MG/DL (ref 0.1–2)
BUN BLD-MCNC: 12 MG/DL (ref 7–18)
CALCIUM BLD-MCNC: 8.7 MG/DL (ref 8.5–10.1)
CHLORIDE SERPL-SCNC: 110 MMOL/L (ref 98–112)
CO2 SERPL-SCNC: 27 MMOL/L (ref 21–32)
CREAT BLD-MCNC: 0.8 MG/DL
EOSINOPHIL # BLD AUTO: 0.09 X10(3) UL (ref 0–0.7)
EOSINOPHIL NFR BLD AUTO: 2.3 %
ERYTHROCYTE [DISTWIDTH] IN BLOOD BY AUTOMATED COUNT: 12.4 %
FASTING STATUS PATIENT QL REPORTED: NO
GFR SERPLBLD BASED ON 1.73 SQ M-ARVRAT: 96 ML/MIN/1.73M2 (ref 60–?)
GLOBULIN PLAS-MCNC: 3.5 G/DL (ref 2.8–4.4)
GLUCOSE BLD-MCNC: 111 MG/DL (ref 70–99)
HCT VFR BLD AUTO: 44.3 %
HGB BLD-MCNC: 15.3 G/DL
IMM GRANULOCYTES # BLD AUTO: 0.01 X10(3) UL (ref 0–1)
IMM GRANULOCYTES NFR BLD: 0.3 %
LYMPHOCYTES # BLD AUTO: 1.07 X10(3) UL (ref 1–4)
LYMPHOCYTES NFR BLD AUTO: 27.2 %
MCH RBC QN AUTO: 30.9 PG (ref 26–34)
MCHC RBC AUTO-ENTMCNC: 34.5 G/DL (ref 31–37)
MCV RBC AUTO: 89.5 FL
MONOCYTES # BLD AUTO: 0.31 X10(3) UL (ref 0.1–1)
MONOCYTES NFR BLD AUTO: 7.9 %
NEUTROPHILS # BLD AUTO: 2.43 X10 (3) UL (ref 1.5–7.7)
NEUTROPHILS # BLD AUTO: 2.43 X10(3) UL (ref 1.5–7.7)
NEUTROPHILS NFR BLD AUTO: 61.5 %
OSMOLALITY SERPL CALC.SUM OF ELEC: 284 MOSM/KG (ref 275–295)
PLATELET # BLD AUTO: 199 10(3)UL (ref 150–450)
POTASSIUM SERPL-SCNC: 3.6 MMOL/L (ref 3.5–5.1)
PROT SERPL-MCNC: 6.6 G/DL (ref 6.4–8.2)
RBC # BLD AUTO: 4.95 X10(6)UL
SODIUM SERPL-SCNC: 137 MMOL/L (ref 136–145)
WBC # BLD AUTO: 3.9 X10(3) UL (ref 4–11)

## 2023-04-17 PROCEDURE — 99215 OFFICE O/P EST HI 40 MIN: CPT | Performed by: INTERNAL MEDICINE

## 2023-04-17 PROCEDURE — 85025 COMPLETE CBC W/AUTO DIFF WBC: CPT

## 2023-04-17 PROCEDURE — 36415 COLL VENOUS BLD VENIPUNCTURE: CPT

## 2023-04-17 PROCEDURE — 96372 THER/PROPH/DIAG INJ SC/IM: CPT

## 2023-04-17 PROCEDURE — 80053 COMPREHEN METABOLIC PANEL: CPT

## 2023-04-17 PROCEDURE — 86316 IMMUNOASSAY TUMOR OTHER: CPT

## 2023-04-17 PROCEDURE — 96040 HC GENETIC COUNSELING EA 30 MIN: CPT | Performed by: GENETIC COUNSELOR, MS

## 2023-04-17 RX ORDER — LANREOTIDE ACETATE 120 MG/.5ML
120 INJECTION SUBCUTANEOUS ONCE
Status: COMPLETED | OUTPATIENT
Start: 2023-04-17 | End: 2023-04-17

## 2023-04-17 RX ORDER — LANREOTIDE ACETATE 120 MG/.5ML
120 INJECTION SUBCUTANEOUS ONCE
Status: CANCELLED | OUTPATIENT
Start: 2023-04-17

## 2023-04-17 RX ADMIN — LANREOTIDE ACETATE 120 MG: 120 INJECTION SUBCUTANEOUS at 09:42:00

## 2023-04-17 NOTE — PROGRESS NOTES
Outpatient Oncology Care Plan  Problem list:  knowledge deficit    Problems related to:    disease/disease progression  side effect of treatment    Interventions:  provided general teaching    Expected outcomes:  understands plan of care    Progress towards outcome:  making progress    Education Record    Learner:  Patient  Barriers / Limitations:  None  Method:  Brief focused  Outcome:  Shows understanding  Comments: MD f/up and first injection of lanreotide. Pt states he is feeling well. States he will be having surgery at [facility] 5/16.

## 2023-04-17 NOTE — TELEPHONE ENCOUNTER
Patient and wife were in to see Dr Mary Anne Daley today. After the appt,  Dr Mary Anne Daley ordered additional labs. Wife stated it was not discussed  And is asking what the labs are for?     Please call wife Debbie Metzger

## 2023-04-17 NOTE — PROGRESS NOTES
Education Record    Learner:  Patient    Disease / Diagnosis: Neuronedocrine Carcinoma    Barriers / Limitations:  None   Comments:    Method:  Brief focused   Comments:    General Topics:  Plan of care reviewed   Comments:    Outcome:  Shows understanding   Comments:

## 2023-04-18 ENCOUNTER — TELEPHONE (OUTPATIENT)
Dept: HEMATOLOGY/ONCOLOGY | Facility: HOSPITAL | Age: 68
End: 2023-04-18

## 2023-04-18 ENCOUNTER — PATIENT MESSAGE (OUTPATIENT)
Dept: HEMATOLOGY/ONCOLOGY | Facility: HOSPITAL | Age: 68
End: 2023-04-18

## 2023-04-18 NOTE — TELEPHONE ENCOUNTER
Patient had injection on 4/17/23 and since 200 pm he has had severe stomach cramps, eating and drinking water makes it worst, severe pain, pain is 2-10 out of 10. Dr. Mary Anne Daley pt.

## 2023-04-18 NOTE — TELEPHONE ENCOUNTER
[facility] at [phone] is calling to discuss the site of care for his injection, please call Lj Harry.

## 2023-04-18 NOTE — TELEPHONE ENCOUNTER
Spoke with Cathy Harry she states pt will need to get remaining injections elsewhere/non hospital setting. Asking if it would be ok to help pt coordinate this. Permission given. Fax number given. She states pt will be assigned a  who will talk to pt and will call us back with update.

## 2023-04-19 ENCOUNTER — TELEPHONE (OUTPATIENT)
Dept: HEMATOLOGY/ONCOLOGY | Facility: HOSPITAL | Age: 68
End: 2023-04-19

## 2023-04-19 LAB — CHROMOGRANIN A: 1463 NG/ML

## 2023-04-19 NOTE — TELEPHONE ENCOUNTER
Patients wife is calling questioning why her  got Lanreotide inj, when they got paperwork for Octreotide inj. Please call to further discuss.

## 2023-04-20 NOTE — TELEPHONE ENCOUNTER
Spoke with patient he is upset that he was never told that he would be getting lanreotide instead of octreotide. Explained to pt that medication is the same and his insurance would not cover octreotide so that is why lanreotide was given. Pt v/u but states that he is not happy that MD never explained changing due to coverage and the people giving the injection never told him he was not getting octreotide either. Pt states he was sent home with instructions and side effects for octreotide. States he had a reaction to the medication and was calling the office the following day not knowing he received a different injection. Pt states he wants Dr. Berkley Holland to know that his symptoms he talked with him about subsided on their own and he did not need to pick-up the prescription for cramping.

## 2023-04-28 ENCOUNTER — TELEPHONE (OUTPATIENT)
Dept: HEMATOLOGY/ONCOLOGY | Facility: HOSPITAL | Age: 68
End: 2023-04-28

## 2023-04-28 NOTE — TELEPHONE ENCOUNTER
Please call the patient back regarding the injections and options the patient may have. The insurance said they will only pay for 1 injection. He needs to know of other options. Thank you.

## 2023-04-28 NOTE — TELEPHONE ENCOUNTER
Spoke with wife and pt. Provided [employer] phone number from when RN spoke with them on 4/18. They will call pharmacy.

## 2023-05-01 ENCOUNTER — GENETICS ENCOUNTER (OUTPATIENT)
Dept: HEMATOLOGY/ONCOLOGY | Facility: HOSPITAL | Age: 68
End: 2023-05-01

## 2023-05-01 NOTE — PROGRESS NOTES
Patient Name: Gavino Hutton  YOB: 1955    Referring Provider:  Iirs Farris MD     Reason for Referral:  Mr. Keaton Be had genetic testing performed on 4/17/2023 because of a diagnosis of a duodenal gastrinoma at age 69y and a family history of cancer. Genetic Testing Result:  Negative for pathogenic variants. One variant of uncertain significance was identified. No pathogenic variant was found in the following 84 genes on the [place] [facility] multi-cancer + RNA panel: AIP, ALK, APC*, HOUSTON*, AXIN2, BAP1, BARD1, BLM, BMPR1A, BRCA1, BRCA2, BRIP1, CASR, CDC73, CDH1, CDK4, CDKN1B, CDKN1C, CDKN2A (p14ARF), CDKN2A ([id number]), CEBPA, CHEK2, CTNNA1, DICER1*, DIS3L2, EGFR, EPCAM*, FH*, FLCN, GATA2, GPC3*, GREM1*, HOXB13, HRAS, KIT, MAX*, MEN1*, MET*, MITF*, MLH1*, MSH2*, MSH3*, MSH6*, MUTYH, NBN, NF1*, NF2, NTHL1, PALB2, PDGFRA, PHOX2B*, PMS2*, POLD1*, POLE, POT1, IABVR9I, PTCH1, PTEN*, RAD50, RAD51C, RAD51D, RB1*, RECQL4*, RET, RUNX1, SDHA*, SDHAF2, SDHB, SDHC*, SDHD, SMAD4, SMARCA4, SMARCB1, SMARCE1, STK11, SUFU, TERC, TERT, TBVT741, TP53, TSC1*, TSC2, VHL, WRN*, WT1. A variant of uncertain significance, SDHA c.112G>C (p.Wdb11Qer), was identified. Please refer to the report from [facility] for additional testing information. These results were discussed with Mr. Keaton Be via telephone on 5/1/2023. Summary and Plan:   These results indicate it is unlikely that Mr. Keaton Be has a pathogenic variant (harmful genetic mutation) in any of the genes listed above. No pathogenic variants associated with hereditary cancer syndromes were identified. The etiology Mr. Prieto Lev personal and family history of cancer remains genetically unexplained. Mr. Keaton Be was found to have a SDHA c.112G>C (T.IFE21IKG) variant of uncertain significance.  A variant of uncertain significance (VUS) means that a mutation has been identified in a cancer susceptibility gene; however, it is currently uncertain if the mutation is pathogenic (harmful) or benign (harmless). With time, the mutation may be reclassified as either harmful or harmless. No changes in management or testing of family members is recommended based on a VUS result. The limitations of the testing were discussed with Mr. Lukasz Jesus including the chance that a pathogenic variant in a gene other than those included in this analysis might be the cause of cancer in Mr. Lukasz Jesus or in relatives. Medical management and surveillance for Mr. Lukasz Jesus and other family members should be based on their personal and family history. All medical management decisions should be made with a physician. I encouraged Mr. Lukasz Jesus to share his genetic test results with his children and other relatives so that they may discuss the implications of this information with their health providers. Mr. Lukasz Jesus is also encouraged to contact me on an annual basis to learn if there have been any updates in genetic testing that would apply, changes in the personal and/or family history, or changes in the classification of the SDHA VUS. Please do not hesitate to contact my office if you have any questions or concerns, [phone].      Win Londono MS, [facility]

## 2023-05-04 RX ORDER — LANREOTIDE ACETATE 120 MG/.5ML
120 INJECTION SUBCUTANEOUS
Qty: 1 EACH | Refills: 11 | Status: SHIPPED | OUTPATIENT
Start: 2023-05-04

## 2023-05-11 ENCOUNTER — TELEPHONE (OUTPATIENT)
Dept: HEMATOLOGY/ONCOLOGY | Facility: HOSPITAL | Age: 68
End: 2023-05-11

## 2023-05-11 NOTE — TELEPHONE ENCOUNTER
Ho Rodarte, Renetta  P Edw Abelino Pan; Nima Reyna RN; James Rui, [facility] [address] - this patient is scheduled for a Somatuline injection on Monday 5/15. The medication has not been shipped and has NOT been received. Can someone please follow-up with the patient to make sure they have called the specialty pharmacy, given approval to ship, pay copay etc.     The medication needs to arrive before treatment. Spoke with pt he states he was told the medication was shipped and we should be receiving it tomorrow. To let him know if any issues.

## 2023-05-15 ENCOUNTER — APPOINTMENT (OUTPATIENT)
Dept: HEMATOLOGY/ONCOLOGY | Facility: HOSPITAL | Age: 68
End: 2023-05-15
Attending: GENETIC COUNSELOR, MS
Payer: COMMERCIAL

## 2023-12-26 ENCOUNTER — OFFICE VISIT (OUTPATIENT)
Facility: LOCATION | Age: 68
End: 2023-12-26
Payer: MEDICARE

## 2023-12-26 DIAGNOSIS — K40.90 LEFT INGUINAL HERNIA: Primary | ICD-10-CM

## 2023-12-26 PROCEDURE — 99204 OFFICE O/P NEW MOD 45 MIN: CPT | Performed by: SURGERY

## 2024-01-04 NOTE — H&P
New Patient Visit Note       Active Problems      No diagnosis found.    Chief Complaint   Chief Complaint   Patient presents with    Hernia     NP - L inguinal hernia - Abd CT 3/7/23        Allergies  Juan is allergic to losartan.    Past Medical / Surgical / Social / Family History    The past medical and past surgical history have been reviewed by me today.    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    Recurrent cold sores     Spina bifida (HCC)     mild    Visual impairment     glasses     Past Surgical History:   Procedure Laterality Date    ARTHROSCOPY OF JOINT UNLISTED      L knee arthroscopy    CATARACT      COLONOSCOPY N/A 08/29/2017    Procedure: COLONOSCOPY, POSSIBLE BIOPSY, POSSIBLE POLYPECTOMY 99325;  Surgeon: Mina Conrad MD;  [place] [facility] [place] [facility]    COLONOSCOPY N/A 02/11/2021    Procedure: COLONOSCOPY;  Surgeon: Taj Gibbs MD;  Location:  ENDOSCOPY    EYE SURGERY      Retinal detachment surgery       The family history and social history have been reviewed by me today.    Social History     Tobacco Use    Smoking status: Some Days     Types: Cigars    Smokeless tobacco: Never    Tobacco comments:     smokes a cigar about once a week    Substance Use Topics    Alcohol use: Yes     Alcohol/week: 1.0 standard drink of alcohol     Types: 1 Standard drinks or equivalent per week     Comment: 1-2 drinks/night.  no hx of excessive use        Current Outpatient Medications:     lanreotide 120 MG/0.5ML Subcutaneous Solution, Inject 120 mg into the skin every 28 days., Disp: 1 each, Rfl: 11    hyoscyamine 0.125 MG Sublingual SL Tab, Place 1-2 tablets (125-250 mcg total) under the tongue every 4 (four) hours as needed for Cramping., Disp: 60 tablet, Rfl: 0    Zinc Sulfate (ZINC-220 OR),  Take 1 capsule by mouth daily., Disp: , Rfl:     valACYclovir 1 G Oral Tab, as needed (outbreaks)., Disp: , Rfl:     Ascorbic Acid (VITAMIN C OR), Take 1,000 mg by mouth daily., Disp: , Rfl:     Pantoprazole Sodium 40 MG Oral Tab EC, Take 1 tablet (40 mg total) by mouth 2 (two) times daily before meals., Disp: 180 tablet, Rfl: 5    Multiple Vitamins-Minerals (TAB-A-ALEJANDRINA) Oral Tab, Take 1 tablet by mouth daily., Disp: , Rfl:     Ergocalciferol (VITAMIN D OR), Take 2,000 Int'l Units/1.7m2 by mouth daily., Disp: , Rfl:     amLODIPine Besylate 5 MG Oral Tab, Take 1 tablet (5 mg total) by mouth daily., Disp: , Rfl:     Atorvastatin Calcium 40 MG Oral Tab, Take 1 tablet (40 mg total) by mouth daily., Disp: , Rfl:       Review of Systems  The Review of Systems has been reviewed by me during today.  Review of Systems   Constitutional:  Negative for diaphoresis, fever and unexpected weight change.   HENT:  Negative for congestion, nosebleeds, sore throat and trouble swallowing.    Eyes:  Negative for pain, discharge, redness and visual disturbance.   Respiratory:  Negative for cough, shortness of breath and wheezing.    Cardiovascular:  Negative for chest pain and palpitations.   Gastrointestinal:  Positive for abdominal pain. Negative for abdominal distention, blood in stool, constipation, diarrhea, nausea and vomiting.   Genitourinary:  Negative for difficulty urinating, dysuria and frequency.   Musculoskeletal:  Negative for joint swelling and neck pain.   Skin:  Negative for color change and rash.   Neurological:  Negative for dizziness, syncope and light-headedness.   Hematological:  Negative for adenopathy. Does not bruise/bleed easily.   Psychiatric/Behavioral:  Negative for confusion, hallucinations and sleep disturbance.        Physical Findings   There were no vitals taken for this visit.  Physical Exam  Constitutional:       Appearance: He is well-developed.   HENT:      Head: Normocephalic and atraumatic.   Eyes:       Pupils: Pupils are equal, round, and reactive to light.   Cardiovascular:      Rate and Rhythm: Normal rate and regular rhythm.   Pulmonary:      Effort: Pulmonary effort is normal.      Breath sounds: Normal breath sounds.   Abdominal:      General: Bowel sounds are normal. There is no distension.      Palpations: Abdomen is soft.      Tenderness: There is no abdominal tenderness.      Comments: On physical examination no discrete large hernia or fascial defect is noted with straining.  Some laxity is appreciated with straining and this may represent the small hernia noted on imaging.   Musculoskeletal:         General: No deformity. Normal range of motion.   Skin:     General: Skin is warm and dry.      Findings: No erythema or rash.   Neurological:      Mental Status: He is alert and oriented to person, place, and time.               History of Present Illness     68-year-old gentleman who is here for evaluation of left inguinal hernia noted on imaging.    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  CT scan abdomen pelvis 4 follow-up was performed March 2023.  There was no evidence of inguinal hernia at that time.  The patient did have a tiny umbilical hernia noted.    The patient is status post exploratory laparotomy for neuroendocrine tumor on May 16, 2023.      CT scan of her abdomen and pelvis was performed at the [facility] on December 14, 2023 for continued surveillance of neuroendocrine neoplasm.  Small fat-containing left inguinal hernia is reported on this imaging.  Imaging is not available for review      The patient denies any discomfort in the left inguinal region pacifically with activities involving lifting or straining.    On physical examination no discrete large hernia or fascial defect is noted with straining.  Some laxity is appreciated with straining and this may represent the small hernia noted on imaging.    Treatment  options were reviewed in detail for image detected asymptomatic left inguinal hernia.    As hernia is asymptomatic at this time, would not aggressively pursue surgical repair of small fat-containing left inguinal hernia.  Also, given patient's prior history of laparotomy, there is increased risk for conversion to open procedure.    Juan will follow-up with me should he develop any symptoms of left inguinal or groin pain.            Assessment   No diagnosis found.      Plan     As hernia is asymptomatic at this time, would not aggressively pursue surgical repair of small fat-containing left inguinal hernia.  Also, given patient's prior history of laparotomy, there is increased risk for conversion to open procedure.    Juan will follow-up with me should he develop any symptoms of left inguinal or groin pain.           The risks, benefits, complications, possible outcomes and alternatives of the procedure were explained to the patient in detail.   Expected postoperative pain, recuperation and postoperative course was also reviewed.  All questions from the patient were answered in detail.  The patient did verbalize understanding and does not have any further questions at this time.  The patient does wish to proceed with the proposed procedure.      No orders of the defined types were placed in this encounter.      Imaging & Referrals   None    Follow Up  No follow-ups on file.    Arin Kelly MD      Please note that this report has been produced using speech recognition software and may contain errors related to that system including but not limited to errors in grammar, punctuation and spelling as well as words and phrases that possibly may have been recognized inappropriately.  If there are any questions or concerns please contact the dictating provider for clarification.

## 2024-09-06 ENCOUNTER — HOSPITAL ENCOUNTER (OUTPATIENT)
Facility: HOSPITAL | Age: 69
Setting detail: HOSPITAL OUTPATIENT SURGERY
Discharge: HOME | End: 2024-09-06
Attending: INTERNAL MEDICINE | Admitting: INTERNAL MEDICINE
Payer: MEDICARE

## 2024-09-06 ENCOUNTER — ANESTHESIA (OUTPATIENT)
Dept: ENDOSCOPY | Facility: HOSPITAL | Age: 69
End: 2024-09-06
Payer: MEDICARE

## 2024-09-06 ENCOUNTER — ANESTHESIA EVENT (OUTPATIENT)
Dept: ENDOSCOPY | Facility: HOSPITAL | Age: 69
End: 2024-09-06
Payer: MEDICARE

## 2024-09-06 VITALS
OXYGEN SATURATION: 95 % | HEIGHT: 72 IN | SYSTOLIC BLOOD PRESSURE: 124 MMHG | WEIGHT: 252 LBS | RESPIRATION RATE: 20 BRPM | TEMPERATURE: 98 F | BODY MASS INDEX: 34.13 KG/M2 | HEART RATE: 59 BPM | DIASTOLIC BLOOD PRESSURE: 69 MMHG

## 2024-09-06 PROCEDURE — 07DC8ZX EXTRACTION OF PELVIS LYMPHATIC, VIA NATURAL OR ARTIFICIAL OPENING ENDOSCOPIC, DIAGNOSTIC: ICD-10-PCS | Performed by: INTERNAL MEDICINE

## 2024-09-06 PROCEDURE — 0DB38ZX EXCISION OF LOWER ESOPHAGUS, VIA NATURAL OR ARTIFICIAL OPENING ENDOSCOPIC, DIAGNOSTIC: ICD-10-PCS | Performed by: INTERNAL MEDICINE

## 2024-09-06 PROCEDURE — 88305 TISSUE EXAM BY PATHOLOGIST: CPT | Performed by: INTERNAL MEDICINE

## 2024-09-06 PROCEDURE — BW41ZZZ ULTRASONOGRAPHY OF ABDOMEN AND PELVIS: ICD-10-PCS | Performed by: INTERNAL MEDICINE

## 2024-09-06 PROCEDURE — 88342 IMHCHEM/IMCYTCHM 1ST ANTB: CPT | Performed by: INTERNAL MEDICINE

## 2024-09-06 PROCEDURE — 88341 IMHCHEM/IMCYTCHM EA ADD ANTB: CPT | Performed by: INTERNAL MEDICINE

## 2024-09-06 PROCEDURE — 88360 TUMOR IMMUNOHISTOCHEM/MANUAL: CPT | Performed by: INTERNAL MEDICINE

## 2024-09-06 RX ORDER — SODIUM CHLORIDE, SODIUM LACTATE, POTASSIUM CHLORIDE, CALCIUM CHLORIDE 600; 310; 30; 20 MG/100ML; MG/100ML; MG/100ML; MG/100ML
INJECTION, SOLUTION INTRAVENOUS CONTINUOUS
Status: DISCONTINUED | OUTPATIENT
Start: 2024-09-06 | End: 2024-09-06

## 2024-09-06 RX ORDER — LIDOCAINE HYDROCHLORIDE 20 MG/ML
INJECTION, SOLUTION EPIDURAL; INFILTRATION; INTRACAUDAL; PERINEURAL AS NEEDED
Status: DISCONTINUED | OUTPATIENT
Start: 2024-09-06 | End: 2024-09-06 | Stop reason: SURG

## 2024-09-06 RX ORDER — NALOXONE HYDROCHLORIDE 0.4 MG/ML
0.08 INJECTION, SOLUTION INTRAMUSCULAR; INTRAVENOUS; SUBCUTANEOUS ONCE AS NEEDED
Status: DISCONTINUED | OUTPATIENT
Start: 2024-09-06 | End: 2024-09-06

## 2024-09-06 RX ADMIN — SODIUM CHLORIDE, SODIUM LACTATE, POTASSIUM CHLORIDE, CALCIUM CHLORIDE: 600; 310; 30; 20 INJECTION, SOLUTION INTRAVENOUS at 08:45:00

## 2024-09-06 RX ADMIN — LIDOCAINE HYDROCHLORIDE 100 MG: 20 INJECTION, SOLUTION EPIDURAL; INFILTRATION; INTRACAUDAL; PERINEURAL at 08:39:00

## 2024-09-06 NOTE — ANESTHESIA POSTPROCEDURE EVALUATION
[facility]    Juan Gasca Patient Status:  Hospital Outpatient Surgery   Age/Gender 69 year old male MRN [id number]   Location [facility] ENDOSCOPY PAIN CENTER Attending Wayne Kirk MD   Hosp Day # 0 PCP Uriel Ott MD       Anesthesia Post-op Note    ESOPHAGOGASTRODUODENOSCOPY (EGD) with biopsies,WITH UPPER ENDOSCOPIC ULTRASOUND (EUS), AND FINE NEEDLE BIOPSY    Procedure Summary       Date: 09/06/24 Room / Location:  ENDOSCOPY 03 /  ENDOSCOPY    Anesthesia Start: 0837 Anesthesia Stop: 0909    Procedures:       ESOPHAGOGASTRODUODENOSCOPY (EGD) with biopsies,WITH UPPER ENDOSCOPIC ULTRASOUND (EUS), AND FINE NEEDLE BIOPSY      ESOPHAGOGASTRODUODENOSCOPY (EGD) Diagnosis: (BARRETS AND LYMPHONAPATHY)    Surgeons: Wayne Kirk MD Anesthesiologist: Mo Nolan MD    Anesthesia Type: MAC ASA Status: 2            Anesthesia Type: MAC    Vitals Value Taken Time   /68 09/06/24 0909   Temp   09/06/24 0909   Pulse 71 09/06/24 0909   Resp 14 09/06/24 0909   SpO2 97 % 09/06/24 0909       Patient Location: Endoscopy    Anesthesia Type: MAC    Airway Patency: patent    Postop Pain Control: adequate    Mental Status: mildly sedated but able to meaningfully participate in the post-anesthesia evaluation    Nausea/Vomiting: none    Cardiopulmonary/Hydration status: stable euvolemic    Complications: no apparent anesthesia related complications    Postop vital signs: stable    Dental Exam: Unchanged from Preop    Patient to be discharged home when criteria met.

## 2024-09-06 NOTE — OPERATIVE REPORT
[facility] OPERATIVE REPORT   PATIENT NAME: Juan Gasca  MRN: [id number]  DATE OF OPERATION: 9/6/2024  PREOPERATIVE DIAGNOSIS: metastatic neuroendocrine tumor s/p EMR of duodenal lesion and ex-lap and resection of mesenteric masses; now with abnormal CT suggestive of recurrent adenopathy; Landrum's suspected on recent endoscopy performed at [facility]  POSTOPERATIVE DIAGNOSIS:    1.  EGD    - no recurrence of duodenal EMR site    - irregular Z line and tiny islands s/p biopsies    2.  EUS    - multiple retroperitoneal masses s/p FNB (10-13mm)  PROCEDURE PERFORMED: upper endoscopy/ ENDOSCOPIC ultrasound with FNB  SEDATION MEDICATIONS: MAC  PREOPERATIVE MEDICATIONS:   PREPROCEDURE ASSESSMENT: The indication for this procedure is to assess for mass. The patient was identified by myself and nursing staff in the exam room. Informed consent was obtained. The patient was seen in clinic and a full H&P was obtained. On brief physical examination, airway is patent. Chest is clear. Heart has regular rate and rhythm. Abdomen is soft, nontender with good bowel sounds. A medication list was taken by nursing today and reviewed by myself. The patient is an ASA grade 2.   PROCEDURE NOTE: The procedure was completed without difficulty. The patient tolerated the procedure well.   Upper endoscopy (EGD):  The endoscope was inserted through the mouth and advanced to the level of the duodenum, 3rd portion.  Visualized portion of the esophagus, stomach including antrum, body, fundus and cardiac, and duodenum were normal. Irregular Z line was noted and biopsies were taken.  Duodenal polypectomy site in the sweep appeared without recurrence.  Endoscopic ultrasound (EUS):  Endoscopic ultrasound was performed using the linear echoendoscope.  Images were obtained.    LIVER: Left lobe of the liver was visualized and no mass or lesions seen.  No intrahepatic duct dilation was noted.  Portal vein was noted to come out of the liver and the  portal confluence was seen and pancreas was noted.   PANCREAS:  Pancreatic neck, body, and tail were interrogated from the gastric body while the neck, head and uncinate were examined from the 1st and 2nd duodenum.  PD- normal  Pancreas divisum:    no  Chronic pancreatitis changes:    no  Neoplasm:    no   Cysts:      no  Biliary Tree: normal  - stones:  no  GALLBLADDER:  visualized and was normal without stones or sludge  CELIAC AXIS:  visualized without lymphadenopathy  L ADRENAL GLAND:  visualized   L KIDNEY:  visualized   MEDIASTINUM:  visualized without lymphadenopathy  Multiple lymph nodes were noted and samples were taken with 22G FNB needle x 3 using color flow doppler to make sure there no intervening vessels. Adequate tissue was obtained for cytology.  Scope was withdrawn from the patient and patient tolerated the procedure well.  FINDINGS   Recurrent neuroendocrine tumor was noted  RECOMMENDATIONS:   DISCHARGE:  On discharge, the patient was given an after-visit summary detailing the procedure, findings, followup plans, and an updated medication list.     Thank you very much for the consultation.  I really appreciate it.    Wayne Kirk MD

## 2024-09-06 NOTE — H&P
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    Body mass index is 34.18 kg/m².    Past Medical History:    Asthma (HCC)    Esophageal reflux    High blood pressure    High cholesterol    Hx of motion sickness    Osteoarthritis    Primary malignant neuroendocrine tumor of duodenum (HCC)    Recurrent cold sores    Spina bifida (HCC)    mild    Visual impairment    glasses       Procedure: EGD EUS    Physician Pre-Sedation Assessment    Pre-Sedation Assessment:    Sedation History: Airway Assessed    ASA Classification: 2. Patient with mild systemic disease    Cardiac:    Respiratory:    Abdomen:      Plan: MAC        Current Facility-Administered Medications   Medication Dose Route Frequency    lactated ringers infusion   Intravenous Continuous       Allergies:   Allergies   Allergen Reactions    Losartan ANGIOEDEMA       Past Surgical History:   Procedure Laterality Date    Arthroscopy of joint unlisted      L knee arthroscopy    Cataract      Colonoscopy N/A 08/29/2017    Procedure: COLONOSCOPY, POSSIBLE BIOPSY, POSSIBLE POLYPECTOMY 96374;  Surgeon: Mina Conrad MD;  [place] [facility] [place] [facility]    Colonoscopy N/A 02/11/2021    Procedure: COLONOSCOPY;  Surgeon: Taj Gibbs MD;  Location:  ENDOSCOPY    Eye surgery      Retinal detachment surgery    Other surgical history      laporatomy for cancer    Upper gi endoscopy,exam      tumor removed from duodm     Family History   Problem Relation Age of Onset    Hypertension Mother     Lipids Mother     Heart Attack Mother     Cancer Father 81        colon ca    Heart Attack Maternal Grandmother     Cancer Maternal Grandfather          unknown type, dx >50y    Heart Attack Paternal Grandmother     Stroke Paternal Aunt         at a \"young age\"    Thyroid Disorder Daughter      Social History     Tobacco Use    Smoking status: Some Days     Types: Cigars    Smokeless tobacco: Never    Tobacco comments:     smokes a cigar about once a week    Substance Use Topics    Alcohol use: Yes     Alcohol/week: 10.0 standard drinks of alcohol     Types: 10 Standard drinks or equivalent per week     Comment: 1-2 drinks/night.  no hx of excessive use       SYSTEM Check if Review is Normal Check if Physical Exam is Normal If not normal, please explain:   HEENT [x ] [x ]    NECK & BACK [x ] [x ]    HEART [x ] [x ]    LUNGS [x ] [x ]    ABDOMEN [x ] [x ]    UROGENITAL [ ] [ ]    EXTREMITIES [ ] [ ]    OTHER        [ x ] I have discussed the risks and benefits and alternatives with the patient/family.  They understand and agree to proceed with plan of care.  [ x ] I have reviewed the History and Physical done within the last 30 days.  Any changes noted above.    Wayne Kirk MD  9/6/2024  8:26 AM

## 2024-09-06 NOTE — DISCHARGE INSTRUCTIONS
Home Care Instructions Gastroscopy with Sedation    Diet:  - Resume your regular diet as tolerated unless otherwise instructed.  - Start with light meals to minimize bloating.  - Do not drink alcohol today.    Medication:  - If you have questions about resuming your normal medications, please contact your Primary Care Physician.    Activities:  - Take it easy today. Do not return to work today.  - Do not drive today.  - Do not operate any machinery today (including kitchen equipment).    Gastroscopy:  - You may have a sore throat for 2-3 days following the exam. This is normal. Gargling with warm salt water (1/2 tsp salt to 1 glass warm water) or using throat lozenges will help.  - If you experience any sharp pain in your neck, abdomen or chest, vomiting of blood, oral temperature over 100 degrees Fahrenheit, light-headedness or dizziness, or any other problems, contact your doctor.    **If unable to reach your doctor, please go to the [facility] Emergency Room**    - Your referring physician will receive a full report of your examination.  - If you do not hear from your doctor's office within two weeks of your biopsy, please call them for your results.

## 2024-09-06 NOTE — ANESTHESIA PREPROCEDURE EVALUATION
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  Temp: 97.7 °F (36.5 °C)  Pulse: 71  Resp: 20  BP: 130/70  SpO2: 97 %  Body mass index is 34.18 kg/m².    Current medications reviewed.  Hospital Medications:   lactated ringers infusion   Intravenous Continuous       Outpatient Medications:     Medications Prior to Admission   Medication Sig Dispense Refill Last Dose    Cyanocobalamin (VITAMIN B-12 OR) Place under the tongue daily.       ACETYLCARNITINE OR Take 300 mg by mouth daily.       valACYclovir 1 G Oral Tab as needed (outbreaks).       Ascorbic Acid (VITAMIN C OR) Take 1,000 mg by mouth daily.       Pantoprazole Sodium 40 MG Oral Tab EC Take 1 tablet (40 mg total) by mouth 2 (two) times daily before meals. 180 tablet 5     Multiple Vitamins-Minerals (TAB-A-ALEJANDRINA) Oral Tab Take 1 tablet by mouth daily.       Ergocalciferol (VITAMIN D OR) Take 2,000 Int'l Units/1.7m2 by mouth daily.       amLODIPine Besylate 5 MG Oral Tab Take 1 tablet (5 mg total) by mouth daily.       Atorvastatin Calcium 40 MG Oral Tab Take 1 tablet (40 mg total) by mouth daily.          Allergies: Losartan      Anesthesia Evaluation    Patient summary reviewed.    Anesthetic Complications  (-) history of anesthetic complications         GI/Hepatic/Renal      (+) GERD and well controlled                          Cardiovascular        Exercise tolerance: good     MET: >4    (+)  obesity  (+) hypertension                                     Endo/Other    Negative endo/other ROS.                              Pulmonary      (+) asthma                     Neuro/Psych    Negative neuro/psych ROS.                          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)           Past Surgical History:   Procedure Laterality Date    Arthroscopy of joint unlisted      L knee arthroscopy    Cataract      Colonoscopy N/A 08/29/2017    Procedure: COLONOSCOPY, POSSIBLE BIOPSY, POSSIBLE POLYPECTOMY 78397;  Surgeon: Mina Conrad MD;  Location: [facility] [place] SURGICAL [facility]    Colonoscopy N/A 02/11/2021    Procedure: COLONOSCOPY;  Surgeon: Taj Gibbs MD;  Location:  ENDOSCOPY    Eye surgery      Retinal detachment surgery    Other surgical history      laporatomy for cancer    Upper gi endoscopy,exam      tumor removed from duodm     Social History     Socioeconomic History    Marital status:    Tobacco Use    Smoking status: Some Days     Types: Cigars    Smokeless tobacco: Never    Tobacco comments:     smokes a cigar about once a week    Vaping Use    Vaping status: Never Used   Substance and Sexual Activity    Alcohol use: Yes     Alcohol/week: 10.0 standard drinks of alcohol     Types: 10 Standard drinks or equivalent per week     Comment: 1-2 drinks/night.  no hx of excessive use    Drug use: Not Currently     Types: Cannabis     Comment: occasional use for knee pain, once/10 years     History   Drug Use Unknown     Comment: occasional use for knee  pain, once/10 years     Available pre-op labs reviewed.               Airway      Mallampati: II  Mouth opening: >3 FB  TM distance: 4 - 6 cm  Neck ROM: full Cardiovascular    Cardiovascular exam normal.  Rhythm: regular  Rate: normal     Dental             Pulmonary    Pulmonary exam normal.                 Other findings              ASA: 2   Plan: MAC  NPO status verified and patient meets guidelines.    Post-procedure pain management plan discussed with surgeon and patient.    Comment: I explained the intrinsic risks of MAC anesthesia to Juan ANTOINE Gasca including intraoperative awareness/recall, PONV, post-operative pain/discomfort, risk of aspiration, insertion of naso- or oropharyngeal airways, conversion to general anesthesia, dental injury, pressure/nerve injuries from positioning, and other serious but rare complications (life-threatening cardiopulmonary events). All questions were answered and patient demonstrated understanding of realistic expectations and risks of undergoing anesthesia. Informed consent was signed by patient.    Plan/risks discussed with: patient and spouse                Present on Admission:  **None**

## 2024-09-16 ENCOUNTER — TELEPHONE (OUTPATIENT)
Dept: HEMATOLOGY/ONCOLOGY | Facility: HOSPITAL | Age: 69
End: 2024-09-16

## 2024-09-17 ENCOUNTER — TELEPHONE (OUTPATIENT)
Dept: HEMATOLOGY/ONCOLOGY | Facility: HOSPITAL | Age: 69
End: 2024-09-17

## 2024-09-18 ENCOUNTER — OFFICE VISIT (OUTPATIENT)
Dept: HEMATOLOGY/ONCOLOGY | Facility: HOSPITAL | Age: 69
End: 2024-09-18
Attending: INTERNAL MEDICINE
Payer: MEDICARE

## 2024-09-18 VITALS
HEART RATE: 75 BPM | OXYGEN SATURATION: 97 % | TEMPERATURE: 97 F | HEIGHT: 72.01 IN | RESPIRATION RATE: 18 BRPM | DIASTOLIC BLOOD PRESSURE: 81 MMHG | WEIGHT: 248 LBS | SYSTOLIC BLOOD PRESSURE: 148 MMHG | BODY MASS INDEX: 33.59 KG/M2

## 2024-09-18 DIAGNOSIS — E16.4: ICD-10-CM

## 2024-09-18 DIAGNOSIS — C7B.8 METASTATIC MALIGNANT NEUROENDOCRINE TUMOR TO LYMPH NODE (HCC): Primary | ICD-10-CM

## 2024-09-18 DIAGNOSIS — C7A.8 PRIMARY MALIGNANT NEUROENDOCRINE TUMOR OF DUODENUM (HCC): ICD-10-CM

## 2024-09-18 LAB
ALBUMIN SERPL-MCNC: 4.3 G/DL (ref 3.2–4.8)
ALBUMIN/GLOB SERPL: 1.5 {RATIO} (ref 1–2)
ALP LIVER SERPL-CCNC: 84 U/L
ALT SERPL-CCNC: 23 U/L
ANION GAP SERPL CALC-SCNC: 8 MMOL/L (ref 0–18)
AST SERPL-CCNC: 22 U/L (ref ?–34)
BASOPHILS # BLD AUTO: 0.04 X10(3) UL (ref 0–0.2)
BASOPHILS NFR BLD AUTO: 0.8 %
BILIRUB SERPL-MCNC: 1.5 MG/DL (ref 0.2–1.1)
BUN BLD-MCNC: 16 MG/DL (ref 9–23)
CALCIUM BLD-MCNC: 9.6 MG/DL (ref 8.7–10.4)
CHLORIDE SERPL-SCNC: 107 MMOL/L (ref 98–112)
CO2 SERPL-SCNC: 26 MMOL/L (ref 21–32)
CREAT BLD-MCNC: 0.94 MG/DL
EGFRCR SERPLBLD CKD-EPI 2021: 87 ML/MIN/1.73M2 (ref 60–?)
EOSINOPHIL # BLD AUTO: 0.09 X10(3) UL (ref 0–0.7)
EOSINOPHIL NFR BLD AUTO: 1.7 %
ERYTHROCYTE [DISTWIDTH] IN BLOOD BY AUTOMATED COUNT: 12.8 %
FASTING STATUS PATIENT QL REPORTED: NO
GLOBULIN PLAS-MCNC: 2.8 G/DL (ref 2–3.5)
GLUCOSE BLD-MCNC: 93 MG/DL (ref 70–99)
HCT VFR BLD AUTO: 44.8 %
HGB BLD-MCNC: 15.3 G/DL
IMM GRANULOCYTES # BLD AUTO: 0.01 X10(3) UL (ref 0–1)
IMM GRANULOCYTES NFR BLD: 0.2 %
LDH SERPL L TO P-CCNC: 215 U/L
LYMPHOCYTES # BLD AUTO: 1.09 X10(3) UL (ref 1–4)
LYMPHOCYTES NFR BLD AUTO: 20.8 %
MCH RBC QN AUTO: 31.2 PG (ref 26–34)
MCHC RBC AUTO-ENTMCNC: 34.2 G/DL (ref 31–37)
MCV RBC AUTO: 91.2 FL
MONOCYTES # BLD AUTO: 0.41 X10(3) UL (ref 0.1–1)
MONOCYTES NFR BLD AUTO: 7.8 %
NEUTROPHILS # BLD AUTO: 3.59 X10 (3) UL (ref 1.5–7.7)
NEUTROPHILS # BLD AUTO: 3.59 X10(3) UL (ref 1.5–7.7)
NEUTROPHILS NFR BLD AUTO: 68.7 %
OSMOLALITY SERPL CALC.SUM OF ELEC: 293 MOSM/KG (ref 275–295)
PLATELET # BLD AUTO: 195 10(3)UL (ref 150–450)
POTASSIUM SERPL-SCNC: 4.2 MMOL/L (ref 3.5–5.1)
PROT SERPL-MCNC: 7.1 G/DL (ref 5.7–8.2)
RBC # BLD AUTO: 4.91 X10(6)UL
SODIUM SERPL-SCNC: 141 MMOL/L (ref 136–145)
WBC # BLD AUTO: 5.2 X10(3) UL (ref 4–11)

## 2024-09-18 PROCEDURE — G2212 PROLONG OUTPT/OFFICE VIS: HCPCS | Performed by: INTERNAL MEDICINE

## 2024-09-18 PROCEDURE — 99215 OFFICE O/P EST HI 40 MIN: CPT | Performed by: INTERNAL MEDICINE

## 2024-09-18 NOTE — PROGRESS NOTES
Outpatient Oncology Care Plan  Problem list:  knowledge deficit    Problems related to:    combined modality therapy    Interventions:  provided general teaching    Expected outcomes:  understands plan of care    Progress towards outcome:  making progress    Education Record    Learner:  Patient  Barriers / Limitations:  None  Method:  Brief focused  Outcome:  Shows understanding  Comments: md f/up neuroendocrine tumor. Pt states that he went for second opinion at had surgery at [facility] [place] in may of last year. States he had recent imaging and biopsy that is positive for cancer. States he is feeling well and has no s/s.

## 2024-09-18 NOTE — PROGRESS NOTES
Hematology/Oncology Clinic Follow Up Visit    Patient Name: Juan Gasca  Medical Record Number: [id number]    YOB: 1955   PCP: Uriel Ott MD   Other providers: Dr. Taj Gibbs/ Dr. Wayne Kirk (GI), Dr. Stanton Sevilla ([facility] med onc), Dr. Ovi Stauffer ([facility], surg)    Reason for Consultation:  Juan Gasca was seen today for the diagnosis of neuroendocrine tumor    Oncologic History:  *Well-differentiated neuroendocrine tumor, G1- gastrinoma/Zollinger Mishra syndrome   -p/w severe diarrhea, abdominal pain, ulcerative esophagitis  -2/8/21- CT a/p- showed panniculitis vs mesenteric adenitis   -2/11/21- EGD/Colonoscopy (Dr. Gibbs)- LA grade C esophagitis with ulcerations at the GE junction.  2 - 3 cm hiatal hernia. Mild diffuse gastritis.  Biopsies done to assess for h pylori. Multiple 5 -7 cm duodenal ulcers from the bulb to third portion.  Biopsies done. 5 mm transverse colon polyps x 2 and 5 mm descending colon polyps x 2, all removed via cold biopsy forceps. Otherwise normal colon exam.   Random colon biopsies were done to assess for microscopic colitis.  -4/14/21- EGD (Dr. Gibbs)- LA grade B esophagitis with < 2 cm erosions at the GE junction with healed ulcerations.  Biopsies done to assess for Landrum's esophagitis. 3 cm hiatal hernia. Normal stomach and duodenum.  -12/13/22- chromogranin A 3466 (ref <102), gastrin 427 (ref <116)  -12/29/22- 24hr urine 5-HIAA normal   -1/10/23- CT a/p- Diffuse mesenteric stranding with associated mesenteric lymphadenopathy including a cluster of nodes measuring 4.3 cm in the central mesentery.  Portal caval node measures 2.6 x 1.4 cm. No retroperitoneal lymphadenopathy.  2.5 cm septated right renal cyst. Recommend further evaluation with renal MRI.  Hepatic steatosis. Nonspecific gastric wall thickening.  -2/9/23- Gallium PET/CT- The mass within the root of the mesentery shows abnormal gallium uptake, there are multiple 5-10 mm lymph nodes in  the mesentery also showing abnormal activity slightly.  There are larger lymph nodes in the retroperitoneum to the right of midline ventral to the IVC and posterior to the third portion of the duodenum showing marked abnormal activity.  No definite abnormal activity within the chest or neck or skeleton.  -2/27/23- EUS (Dr. Kirk)- showed 1 cm duodenal mass, multiple retroperitoneal masses around portal vein and pancreas, and mild chronic pancreatitis.  Pathology of biopsies of the duodenal mass and portacaval lymph node are consistent with well-differentiated neuroendocrine tumor, grade 1.  Ki-67 <3%.   -3/7/23- CT abd panc protocol- There is a 6 x 7 x 6 mm arterial enhancing lesion within the mid aspect of the duodenum suspicious for the known neuroendocrine neoplasm of the duodenum. There are numerous metastatic lymph nodes seen within the retroperitoneum primarily posterior to the uncinate process of the pancreas and numerous metastatic mesenteric lymph nodes. The largest metastatic mesenteric lymph node measures 4.3 x 1.9 cm. Mild splenomegaly.   -4/17/23- started lanreotide 120mg subQ q4 weeks  -5/10/23- Upper EUS, endoscopic mucosal resection of duodenal tumor (Dr. Rocio Mercado, [facility])- pathology showed   A. GE Junction:             - Landrum's mucosa, negative for dysplasia.             - Squamous mucosa with reflux esophagitis.  B. Second portion of duodenum polyp (EMR):             - Duodenal well differentiated neuroendocrine tumor, Ki67 1.4%, < 2 mitoses/2 mm2 consistent with a grade 1 duodenal neuroendocrine tumor.  Noted to invade submucosa.  Negative margins.  -5/16/23- exploratory laparotomy with resection of retroperitoneal mass and lymph node dissection (Dr. Ovi Stauffer, [facility])- pathology showed metastatic well differentiated neuroendocrine tumor involving four of ten peripancreatic lymph nodes (4/10). Ki67 demonstrates a proliferative index of <3%, consistent with a grade 1 tumor. There are  no apparent mitotic figures.  2 additional mesenteric lymph nodes and 1 hepatoduodenal lymph node was negative for tumor involvement.  Molecular testing shows:  Tumor Mutational [place] Result: 1.2 mutations per megabase  Microsatellite Instability Result: Microsatellite Stable (JAKE)   Findings of Uncertain Clinical Significance  HOUSTON c.7273G>C, p.Z2834Z (NM_000051.4) (VAF: 28%)  ATR c.2938-74_7516-3yfm, p.? (NM_001184.4) (VAF: 21%)  BRCA2 c.1792A>G, p.T598A (NM_000059.4) (VAF: 67%)  ROS1 c.1958C>T, p.S653F (NM_002944.2) (VAF: 46%)  TET2 c.2599T>C, p.Y867H (NM_001127208.3) (VAF: 50%)  TET2 c.5167C>T, p.W7545H (NM_001127208.3) (VAF: 46%)  No pathogenic findings were identified in the 168 genes tested at the level of sensitivity of the assay.  -12/14/23- CT a/p ([facility])- Enhancing nodule beneath the pancreatic head (303/63) measuring 9 mm, appearing stable and likely represent metastatic lymph node. In the prior exam, there was an enhancing adjacent lymph node, currently not seen with a surgical clip in place.   -6/13/24- CT a/p ([facility])- Stable. There is again seen extensive mesenteric lymphadenopathy appearing similar to prior. For example, there is an unchanged lymph node conglomerate measuring approximately 3.9 x 1.4 cm (series 8 image 65). Stable additional enhancing lymph node.   -9/6/24- EGD/EUS (Dr. Kirk)- No recurrence at duodenal EMR site noted. EUS showed multiple retroperitoneal masses/lymph nodes s/p bx with pathology consistent with metastatic well-differentiated neuroendocrine tumor, grade 1. The cells of interest are strongly positive for CK8/18, synaptophysin, chromogranin, and INSM1. The Ki-67 proliferation index is 2%. Overall, the histomorphologic and immunophenotypic findings are compatible with metastasis from the patient's known metastatic well-differentiated neuroendocrine tumor     Tumor marker lab trends:  Date  chromogranin A  Gastrin  12/13/22   3466 (ref <102)  427 (ref  <116)  4/17/23  1463  <<5/2023- Lanreotide x 1 + surgical resection>>   6/8/23  92   20    9/18/24  580   66    ==============================  Interval events: Presents for follow-up.  I last saw him in clinic over a year ago 4/17/23.  Since last visit he received a single dose of lanreotide and underwent EMR resection of his duodenal tumor followed by surgical lymph node dissection of was felt to be the remaining retroperitoneal tonya disease at [facility] as above.  He reports following the lanreotide injection he had 1 day of severe diarrhea and painful abdominal cramping.  Following that his intermittent diarrhea and abdominal pain completely resolved and has not recurred.  He tolerated surgery well and healed without complication.    He returns today due to recurrent disease.  He remains asymptomatic, however on surveillance imaging was noted to have recurrent tonya disease.  He underwent EGD and EUS which confirmed recurrent disease involving retroperitoneal lymph nodes.  No local recurrence within the duodenum was identified.    He denies any other aches or pains. No nausea or vomiting.  Appetite is good.  He denies any significant hot flashes, flushing, or night sweats.  His energy is good, he can walk over a mile at baseline.    Few months ago he had shingles involving his left calf, otherwise denies any recent fevers or infections.    Past Medical History:  Past Medical History:    Asthma (HCC)    Esophageal reflux    High blood pressure    High cholesterol    Hx of motion sickness    Osteoarthritis    Primary malignant neuroendocrine tumor of duodenum (HCC)    Recurrent cold sores    Spina bifida (HCC)    mild    Visual impairment    glasses     Past Surgical History:   Procedure Laterality Date    Arthroscopy of joint unlisted      L knee arthroscopy    Cataract      Colonoscopy N/A 08/29/2017    Procedure: COLONOSCOPY, POSSIBLE BIOPSY, POSSIBLE POLYPECTOMY 61409;  Surgeon: Mina Conrad  MD;  Location: [facility] [place] [facility]    Colonoscopy N/A 02/11/2021    Procedure: COLONOSCOPY;  Surgeon: Taj Gibbs MD;  Location:  ENDOSCOPY    Eye surgery      Retinal detachment surgery    Other surgical history      laporatomy for cancer    Upper gi endoscopy,exam      tumor removed from duodm     Home Medications:   Cyanocobalamin (VITAMIN B-12 OR) Place under the tongue daily.      ACETYLCARNITINE OR Take 300 mg by mouth daily.      valACYclovir 1 G Oral Tab as needed (outbreaks).      Ascorbic Acid (VITAMIN C OR) Take 1,000 mg by mouth daily.      Pantoprazole Sodium 40 MG Oral Tab EC Take 1 tablet (40 mg total) by mouth 2 (two) times daily before meals. 180 tablet 5    Multiple Vitamins-Minerals (TAB-A-ALEJANDRINA) Oral Tab Take 1 tablet by mouth daily.      Ergocalciferol (VITAMIN D OR) Take 2,000 Int'l Units/1.7m2 by mouth daily.      amLODIPine Besylate 5 MG Oral Tab Take 1 tablet (5 mg total) by mouth daily.      Atorvastatin Calcium 40 MG Oral Tab Take 1 tablet (40 mg total) by mouth daily.       Allergies:   Allergies   Allergen Reactions    Losartan ANGIOEDEMA       Psychosocial History:  Social History     Social History Narrative    , has 2 children and 2 grandkids.  Works in [employer].     Social History     Socioeconomic History    Marital status:    Tobacco Use    Smoking status: Some Days     Types: Cigars    Smokeless tobacco: Never    Tobacco comments:     smokes a cigar about once a week    Vaping Use    Vaping status: Never Used   Substance and Sexual Activity    Alcohol use: Yes     Alcohol/week: 10.0 standard drinks of alcohol     Types: 10 Standard drinks or equivalent per week     Comment: 1-2 drinks/night.  no hx of excessive use    Drug use: Not Currently     Types: Cannabis     Comment: occasional use for knee pain, once/10 years   Social History Narrative    , has 2 children and 2 grandkids.  Works in [employer].      Family Medical History:  Family History   Problem Relation Age of Onset    Hypertension Mother     Lipids Mother     Heart Attack Mother     Cancer Father 81        colon ca    Heart Attack Maternal Grandmother     Cancer Maternal Grandfather         unknown type, dx >50y    Heart Attack Paternal Grandmother     Stroke Paternal Aunt         at a \"young age\"    Thyroid Disorder Daughter      Review of Systems:  A 10-point ROS was done with pertinent positives and negative per the HPI    Vital Signs:  Height: 182.9 cm (6' 0.01\") (09/18 1459)  Weight: 112.5 kg (248 lb) (09/18 1459)  BSA (Calculated - sq m): 2.33 sq meters (09/18 1459)  Pulse: 75 (09/18 1459)  BP: 148/81 (09/18 1459)  Temp: 96.9 °F (36.1 °C) (09/18 1459)  Do Not Use - Resp Rate: --  SpO2: 97 % (09/18 1459)    Wt Readings from Last 6 Encounters:   09/18/24 112.5 kg (248 lb)   09/06/24 114.3 kg (252 lb)   04/17/23 115.5 kg (254 lb 9.6 oz)   03/15/23 116.2 kg (256 lb 2 oz)   02/27/23 117.9 kg (260 lb)   02/15/23 118.4 kg (261 lb)     ECOG PS: 0    Physical Examination:  General: Patient is alert and oriented, not in acute distress  Psych: Mood and affect are appropriate  Eyes: EOMI  ENT: Oropharynx is clear  CV: Regular rate and rhythm, no murmurs  Respiratory: Lungs clear to auscultation bilaterally  GI/Abd: Soft, non-tender with normoactive bowel sounds, no hepatosplenomegaly  Neurological: Grossly intact   Lymphatics: No palpable lymphadenopathy  Skin: no rashes or petechiae  Ext: mild BLE swelling, L>R    Laboratory:  Lab Results   Component Value Date    WBC 5.2 09/18/2024    WBC 3.9 (L) 04/17/2023    WBC 4.8 02/11/2021    HGB 15.3 09/18/2024    HGB 15.3 04/17/2023    HGB 13.6 02/11/2021    HCT 44.8 09/18/2024    MCV 91.2 09/18/2024    MCH 31.2 09/18/2024    MCHC 34.2 09/18/2024    RDW 12.8 09/18/2024    .0 09/18/2024    .0 04/17/2023    .0 02/11/2021     Lab Results   Component Value Date    GLU 93 09/18/2024    BUN 16  09/18/2024    BUNCREA 9.3 (L) 02/11/2021    CREATSERUM 0.94 09/18/2024    CREATSERUM 0.80 04/17/2023    CREATSERUM 0.96 03/24/2023    ANIONGAP 8 09/18/2024    GFRNAA 96 02/11/2021    GFRAA 111 02/11/2021    CA 9.6 09/18/2024    OSMOCALC 293 09/18/2024    ALKPHO 84 09/18/2024    AST 22 09/18/2024    ALT 23 09/18/2024    BILT 1.5 (H) 09/18/2024    TP 7.1 09/18/2024    ALB 4.3 09/18/2024    GLOBULIN 2.8 09/18/2024     09/18/2024    K 4.2 09/18/2024     09/18/2024    CO2 26.0 09/18/2024     Lab Results   Component Value Date    PTT 30.9 01/05/2021    INR 1.12 (H) 01/05/2021     Genetic Testing Result 4/17/23:  Negative for pathogenic variants. One variant of uncertain significance was identified. No pathogenic variant was found in the following 84 genes on the [facility] multi-cancer + RNA panel: AIP, ALK, APC*, HOUSTON*, AXIN2, BAP1, BARD1, BLM, BMPR1A, BRCA1, BRCA2, BRIP1, CASR, CDC73, CDH1, CDK4, CDKN1B, CDKN1C, CDKN2A (p14ARF), CDKN2A (m36ZOJ4h), CEBPA, CHEK2, CTNNA1, DICER1*, DIS3L2, EGFR, EPCAM*, FH*, FLCN, GATA2, GPC3*, GREM1*, HOXB13, HRAS, KIT, MAX*, MEN1*, MET*, MITF*, MLH1*, MSH2*, MSH3*, MSH6*, MUTYH, NBN, NF1*, NF2, NTHL1, PALB2, PDGFRA, PHOX2B*, PMS2*, POLD1*, POLE, POT1, XTSAK4M, PTCH1, PTEN*, RAD50, RAD51C, RAD51D, RB1*, RECQL4*, RET, RUNX1, SDHA*, SDHAF2, SDHB, SDHC*, SDHD, SMAD4, SMARCA4, SMARCB1, SMARCE1, STK11, SUFU, TERC, TERT, NABC251, TP53, TSC1*, TSC2, VHL, WRN*, WT1. A variant of uncertain significance, SDHA c.112G>C (p.Sbx74Nxp), was identified.     Imaging:    As reviewed above    Impression & Plan:     *Zollinger Mishra syndrome/ well-differentiated, G1 duodenal neuroendocrine tumor  -Initially presented with primary tumor involving the duodenum with metastasis to portacaval, retroperitoneal, and mesenteric lymph nodes.  Given elevated gastrin level and clinical history of ZES symptoms prior to starting high-dose PPI his diagnosis fits best as Zollinger-Mishra syndrome/gastrinoma.   Prior to starting high-dose PPI he had frequent severe diarrhea, loss of appetite abdominal pain and ulcerative esophagitis.  After he started pantoprazole 40 mg BID symptoms significantly improved other than infrequent episodes of severe diarrhea.  -Received a single dose of lanreotide 4/17/23, then underwent EMR of duodenal tumor followed by surgical retroperitoneal lymph node dissection 5/2023 at [facility].  Tumor markers dropped following initial therapy though have started to increase again.  Imaging shows recurrent retroperitoneal tonya disease confirmed on EUS biopsy 9/6/24.  -Recommend obtaining repeat gallium PET/CT scan to further evaluate extent of disease.  If disease is readily resectable, this may be the preferred approach.  Otherwise recommendation would be to begin long-acting somatostatin analog to be continued indefinitely.    -Patient will bring in CD of prior CT scans performed at [facility] for comparison.  After his gallium PET scan I will review his case in our multidisciplinary GI tumor board with surgical oncology, then I will call him with our recommendations.  He also plans to follow-up at [facility] for an additional opinion.  -follow chromogranin A and gastrin levels as a tumor marker   -continue pantoprazole 40 mg BID  -Germline genetic panel testing was negative     *prior lanreotide induced abd pain and diarrhea  -He previously had transient abdominal cramping pain and diarrhea following lanreotide injection.  I suspect this is less likely to occur if rechallenged at present time given his tumor burden and likely neuroendocrine secretion is lower.  -Will consider prescribing Bentyl prn if a somatostatin analog LAR is given again    I will call him PET scan results are, at that time we will discuss further recommendations and timing of follow-up.    Dustin Rosas MD  Hematology/Medical Oncology  [facility]    Time spent on day of  encounter:  Precharting, Face to face & orders  3884-2950 = 52 minutes  Documentation and coordination of care 8246-7587 = 24 minutes  TOTAL = 76 minutes.    *Note additional time was spent on documentation following day of encounter that is not included in above total.

## 2024-09-20 LAB
CHROMOGRANIN A: 580.1 NG/ML
GASTRIN: 66 PG/ML

## 2024-09-24 ENCOUNTER — DOCUMENTATION ONLY (OUTPATIENT)
Dept: HEMATOLOGY/ONCOLOGY | Facility: HOSPITAL | Age: 69
End: 2024-09-24

## 2024-10-04 ENCOUNTER — HOSPITAL ENCOUNTER (OUTPATIENT)
Dept: NUCLEAR MEDICINE | Facility: HOSPITAL | Age: 69
Discharge: HOME | End: 2024-10-04
Attending: INTERNAL MEDICINE
Payer: MEDICARE

## 2024-10-04 DIAGNOSIS — C7B.8 METASTATIC MALIGNANT NEUROENDOCRINE TUMOR TO LYMPH NODE (HCC): ICD-10-CM

## 2024-10-04 DIAGNOSIS — C7A.8 PRIMARY MALIGNANT NEUROENDOCRINE TUMOR OF DUODENUM (HCC): ICD-10-CM

## 2024-10-04 PROCEDURE — 78815 PET IMAGE W/CT SKULL-THIGH: CPT | Performed by: INTERNAL MEDICINE

## 2024-10-09 ENCOUNTER — TELEPHONE (OUTPATIENT)
Dept: HEMATOLOGY/ONCOLOGY | Facility: HOSPITAL | Age: 69
End: 2024-10-09

## 2024-10-09 NOTE — TELEPHONE ENCOUNTER
His case and recent neuroendocrine gallium PET scan reviewed in our multidisciplinary GI tumor board today.  Surgical oncology feels they are unlikely to be able to offer complete resection given concern for disease involving the mesentery in addition to portacaval lymph node.  Consensus recommendation is for treatment with long-acting somatostatin analog.  I called and discussed with patient.  He is awaiting second opinion follow-up with [facility].  He will call us if he wants to pursue long-acting somatostatin analog therapy with us.    Dustin Rosas MD  Hematology/Medical Oncology  [facility]

## 2024-10-14 ENCOUNTER — TELEPHONE (OUTPATIENT)
Dept: HEMATOLOGY/ONCOLOGY | Facility: HOSPITAL | Age: 69
End: 2024-10-14

## 2024-10-15 NOTE — TELEPHONE ENCOUNTER
Spoke with pt. Informed him MD will enter plan and once approved by insurance will bring pt back to start trtmt. He v/u.

## 2024-10-28 ENCOUNTER — TELEPHONE (OUTPATIENT)
Dept: HEMATOLOGY/ONCOLOGY | Facility: HOSPITAL | Age: 69
End: 2024-10-28

## 2024-10-28 NOTE — TELEPHONE ENCOUNTER
Pt called stated his surgeon Dr. Ovi Stauffer would like for him to hold the Octreotide inj so the pt's lymph node can get bigger before he has surgery.     The pt indicated that Dr. Stauffer was supposed to call and discuss this idea with Dr. Rosas. Pt would like to know if the conversation took place and what are Dr. Rosas's thoughts on the pt holding the Octreotide    Please give pt a call back

## 2024-10-29 ENCOUNTER — TELEPHONE (OUTPATIENT)
Dept: HEMATOLOGY/ONCOLOGY | Facility: HOSPITAL | Age: 69
End: 2024-10-29

## 2024-10-29 NOTE — TELEPHONE ENCOUNTER
I discussed the patient's case over the phone with him again today.  He was told by [facility] that they feel his disease resides in only a single lymph node that showed uptake on PET scan.  During our review in our multidisciplinary  tumor board there was concern that there was likely mesenteric disease as well that they did not appreciate.  Patient wishes to hold off on octreotide injections for now and let the lymph node possibly grow in size and then pursue another surgery at [facility] in the future.  Advised that when we previously reviewed his case we favored treatment with octreotide because we felt it was unlikely that surgery would likely completely cure him.  I did advise that since this is a slow-moving disease it would not be unreasonable to monitor then pursue an additional surgery in the future even if surgical cure is not necessarily attained.  He prefers this route.  He will plan to get a repeat CT scan in about 5 months at [facility] and follow-up with surgeon Dr. Stauffer there.  He will continue to follow with me every 6 months with repeat labs for second opinion review as well.    Dustin Rosas MD  Hematology/Medical Oncology  [facility]

## 2024-10-29 NOTE — TELEPHONE ENCOUNTER
Jd [phone]  [facility]  Has appointment on 11/30/2024    11:30  Infusion 12:00.  He would like to reschedule the appointments . Thanks Ashanti

## 2024-10-30 ENCOUNTER — APPOINTMENT (OUTPATIENT)
Dept: HEMATOLOGY/ONCOLOGY | Facility: HOSPITAL | Age: 69
End: 2024-10-30
Attending: INTERNAL MEDICINE
Payer: MEDICARE

## 2024-10-30 ENCOUNTER — TELEPHONE (OUTPATIENT)
Dept: HEMATOLOGY/ONCOLOGY | Facility: HOSPITAL | Age: 69
End: 2024-10-30

## 2024-10-30 NOTE — TELEPHONE ENCOUNTER
----- Message from Dustin Rosas sent at 10/29/2024  4:48 PM CDT -----  Please cancel patient's appointment for MD follow-up and for octreotide injection for tomorrow (10/30).  Please reschedule a follow-up visit with me with PL labs (no injection) for 6 months from now.     Thanks    Feliciano

## 2024-11-25 ENCOUNTER — WALK IN (OUTPATIENT)
Dept: URGENT CARE | Age: 69
End: 2024-11-25
Attending: EMERGENCY MEDICINE

## 2024-11-25 ENCOUNTER — HOSPITAL ENCOUNTER (OUTPATIENT)
Dept: GENERAL RADIOLOGY | Age: 69
Discharge: HOME | End: 2024-11-25
Attending: EMERGENCY MEDICINE

## 2024-11-25 VITALS
SYSTOLIC BLOOD PRESSURE: 135 MMHG | OXYGEN SATURATION: 97 % | HEART RATE: 69 BPM | DIASTOLIC BLOOD PRESSURE: 82 MMHG | RESPIRATION RATE: 16 BRPM | TEMPERATURE: 97 F

## 2024-11-25 DIAGNOSIS — M25.562 ACUTE PAIN OF LEFT KNEE: Primary | ICD-10-CM

## 2024-11-25 DIAGNOSIS — M25.562 ACUTE PAIN OF LEFT KNEE: ICD-10-CM

## 2024-11-25 PROCEDURE — 73564 X-RAY EXAM KNEE 4 OR MORE: CPT

## 2024-11-25 PROCEDURE — 99203 OFFICE O/P NEW LOW 30 MIN: CPT

## 2024-11-25 RX ORDER — PANTOPRAZOLE SODIUM 40 MG/1
40 TABLET, DELAYED RELEASE ORAL
COMMUNITY

## 2024-11-25 RX ORDER — AMLODIPINE BESYLATE 5 MG/1
TABLET ORAL
COMMUNITY
Start: 2024-11-14

## 2024-11-25 ASSESSMENT — ENCOUNTER SYMPTOMS
ACTIVITY CHANGE: 0
NUMBNESS: 0
WEAKNESS: 0

## 2024-11-25 ASSESSMENT — PAIN SCALES - GENERAL
PAINLEVEL: 6
PAINLEVEL_OUTOF10: 5
PAINLEVEL_OUTOF10: 5

## 2025-04-22 ENCOUNTER — TELEPHONE (OUTPATIENT)
Age: 70
End: 2025-04-22

## 2025-04-22 DIAGNOSIS — C7B.8 METASTATIC MALIGNANT NEUROENDOCRINE TUMOR TO LYMPH NODE (HCC): ICD-10-CM

## 2025-04-22 DIAGNOSIS — C7A.8 PRIMARY MALIGNANT NEUROENDOCRINE TUMOR OF DUODENUM (HCC): ICD-10-CM

## 2025-04-22 NOTE — TELEPHONE ENCOUNTER
Pt called he need a lab order fax over to [facility] at [phone] he would also like a call back      Please give pt a call

## 2025-04-23 ENCOUNTER — LAB ENCOUNTER (OUTPATIENT)
Dept: LAB | Age: 70
End: 2025-04-23
Attending: INTERNAL MEDICINE
Payer: MEDICARE

## 2025-04-30 ENCOUNTER — OFFICE VISIT (OUTPATIENT)
Age: 70
End: 2025-04-30
Attending: INTERNAL MEDICINE
Payer: MEDICARE

## 2025-04-30 VITALS
RESPIRATION RATE: 16 BRPM | TEMPERATURE: 98 F | HEIGHT: 72.01 IN | OXYGEN SATURATION: 95 % | HEART RATE: 71 BPM | BODY MASS INDEX: 35.01 KG/M2 | DIASTOLIC BLOOD PRESSURE: 82 MMHG | WEIGHT: 258.5 LBS | SYSTOLIC BLOOD PRESSURE: 152 MMHG

## 2025-04-30 DIAGNOSIS — C7B.8 METASTATIC MALIGNANT NEUROENDOCRINE TUMOR TO LYMPH NODE (HCC): ICD-10-CM

## 2025-04-30 DIAGNOSIS — E16.4: Primary | ICD-10-CM

## 2025-04-30 DIAGNOSIS — E16.4: ICD-10-CM

## 2025-04-30 DIAGNOSIS — C7A.8 PRIMARY MALIGNANT NEUROENDOCRINE TUMOR OF DUODENUM (HCC): ICD-10-CM

## 2025-04-30 NOTE — PROGRESS NOTES
Hematology/Oncology Clinic Follow Up Visit    Patient Name: Juan Gasca  Medical Record Number: [id number]    YOB: 1955   PCP: Uriel Ott MD   Other providers: Dr. Taj Gibbs/ Dr. Wayne Kirk (GI), Dr. Stanton Sevilla ([facility] med onc), Dr. Ovi Stauffer ([facility], surg)    Reason for Consultation:  Juan aGsca was seen today for the diagnosis of neuroendocrine tumor    Oncologic History:  *Well-differentiated neuroendocrine tumor, G1- gastrinoma/Zollinger Mishra syndrome   -p/w severe diarrhea, abdominal pain, ulcerative esophagitis  -2/8/21- CT a/p- showed panniculitis vs mesenteric adenitis   -2/11/21- EGD/Colonoscopy (Dr. Gibbs)- LA grade C esophagitis with ulcerations at the GE junction.  2 - 3 cm hiatal hernia. Mild diffuse gastritis.  Biopsies done to assess for h pylori. Multiple 5 -7 cm duodenal ulcers from the bulb to third portion.  Biopsies done. 5 mm transverse colon polyps x 2 and 5 mm descending colon polyps x 2, all removed via cold biopsy forceps. Otherwise normal colon exam.   Random colon biopsies were done to assess for microscopic colitis.  -4/14/21- EGD (Dr. Gibbs)- LA grade B esophagitis with < 2 cm erosions at the GE junction with healed ulcerations.  Biopsies done to assess for Landrum's esophagitis. 3 cm hiatal hernia. Normal stomach and duodenum.  -12/13/22- chromogranin A 3466 (ref <102), gastrin 427 (ref <116)  -12/29/22- 24hr urine 5-HIAA normal   -1/10/23- CT a/p- Diffuse mesenteric stranding with associated mesenteric lymphadenopathy including a cluster of nodes measuring 4.3 cm in the central mesentery.  Portal caval node measures 2.6 x 1.4 cm. No retroperitoneal lymphadenopathy.  2.5 cm septated right renal cyst. Recommend further evaluation with renal MRI.  Hepatic steatosis. Nonspecific gastric wall thickening.  -2/9/23- Gallium PET/CT- The mass within the root of the mesentery shows abnormal gallium uptake, there are multiple 5-10 mm lymph nodes in  the mesentery also showing abnormal activity slightly.  There are larger lymph nodes in the retroperitoneum to the right of midline ventral to the IVC and posterior to the third portion of the duodenum showing marked abnormal activity.  No definite abnormal activity within the chest or neck or skeleton.  -2/27/23- EUS (Dr. Kirk)- showed 1 cm duodenal mass, multiple retroperitoneal masses around portal vein and pancreas, and mild chronic pancreatitis.  Pathology of biopsies of the duodenal mass and portacaval lymph node are consistent with well-differentiated neuroendocrine tumor, grade 1.  Ki-67 <3%.   -3/7/23- CT abd panc protocol- There is a 6 x 7 x 6 mm arterial enhancing lesion within the mid aspect of the duodenum suspicious for the known neuroendocrine neoplasm of the duodenum. There are numerous metastatic lymph nodes seen within the retroperitoneum primarily posterior to the uncinate process of the pancreas and numerous metastatic mesenteric lymph nodes. The largest metastatic mesenteric lymph node measures 4.3 x 1.9 cm. Mild splenomegaly.   -4/17/23- started lanreotide 120mg subQ q4 weeks  -5/10/23- Upper EUS, endoscopic mucosal resection of duodenal tumor (Dr. Rocio Mercado, [facility])- pathology showed   A. GE Junction:             - Landrum's mucosa, negative for dysplasia.             - Squamous mucosa with reflux esophagitis.  B. Second portion of duodenum polyp (EMR):             - Duodenal well differentiated neuroendocrine tumor, Ki67 1.4%, < 2 mitoses/2 mm2 consistent with a grade 1 duodenal neuroendocrine tumor.  Noted to invade submucosa.  Negative margins.  -5/16/23- exploratory laparotomy with resection of retroperitoneal mass and lymph node dissection (Dr. Ovi Stauffer, [facility])- pathology showed metastatic well differentiated neuroendocrine tumor involving four of ten peripancreatic lymph nodes (4/10). Ki67 demonstrates a proliferative index of <3%, consistent with a grade 1 tumor. There are  no apparent mitotic figures.  2 additional mesenteric lymph nodes and 1 hepatoduodenal lymph node was negative for tumor involvement.  Molecular testing shows:  Tumor Mutational [place] Result: 1.2 mutations per megabase  Microsatellite Instability Result: Microsatellite Stable (JAKE)   Findings of Uncertain Clinical Significance  HOUSTON c.7273G>C, p.P3907X (NM_000051.4) (VAF: 28%)  ATR c.0239-29_4226-4iub, p.? (NM_001184.4) (VAF: 21%)  BRCA2 c.1792A>G, p.T598A (NM_000059.4) (VAF: 67%)  ROS1 c.1958C>T, p.S653F (NM_002944.2) (VAF: 46%)  TET2 c.2599T>C, p.Y867H (NM_001127208.3) (VAF: 50%)  TET2 c.5167C>T, p.Z4014S (NM_001127208.3) (VAF: 46%)  No pathogenic findings were identified in the 168 genes tested at the level of sensitivity of the assay.  -12/14/23- CT a/p ([facility])- Enhancing nodule beneath the pancreatic head (303/63) measuring 9 mm, appearing stable and likely represent metastatic lymph node. In the prior exam, there was an enhancing adjacent lymph node, currently not seen with a surgical clip in place.   -6/13/24- CT a/p ([facility])- Stable. There is again seen extensive mesenteric lymphadenopathy appearing similar to prior. For example, there is an unchanged lymph node conglomerate measuring approximately 3.9 x 1.4 cm (series 8 image 65). Stable additional enhancing lymph node.   -9/6/24- EGD/EUS (Dr. Kirk)- No recurrence at duodenal EMR site noted. EUS showed multiple retroperitoneal masses/lymph nodes s/p bx with pathology consistent with metastatic well-differentiated neuroendocrine tumor, grade 1. The cells of interest are strongly positive for CK8/18, synaptophysin, chromogranin, and INSM1. The Ki-67 proliferation index is 2%. Overall, the histomorphologic and immunophenotypic findings are compatible with metastasis from the patient's known metastatic well-differentiated neuroendocrine tumor   -4/28/25- CT a/p ([facility])- Relatively unchanged mesenteric and retroperitoneal lymphadenopathy. No new metastatic  disease in the abdomen or pelvis.    Tumor marker lab trends:  Date  chromogranin A  Gastrin  12/13/22   3466 (ref <102)  427 (ref <116)  4/17/23  1463  <<5/2023- Lanreotide x 1 + surgical resection>>   6/8/23  92   20    9/18/24  580   66  4/23/25  407   148    ==============================  Interval events: Presents for follow-up.  He underwent repeat CT imaging earlier this week as above.  He remains off treatment.  He reports feeling well without any new symptoms.  He continues to have some ongoing mild fatigue.  He has not physically active.  No increased dyspnea, chest pain, or lightheadedness.    He denies any bowel or urinary changes.  No new aches or pains. No nausea or vomiting.  Appetite is good.  He denies any significant hot flashes, flushing, or night sweats.      Past Medical History:  Past Medical History:    Asthma (HCC)    Esophageal reflux    High blood pressure    High cholesterol    Hx of motion sickness    Osteoarthritis    Primary malignant neuroendocrine tumor of duodenum (HCC)    Recurrent cold sores    Spina bifida (HCC)    mild    Visual impairment    glasses     Past Surgical History:   Procedure Laterality Date    Arthroscopy of joint unlisted      L knee arthroscopy    Cataract      Colonoscopy N/A 08/29/2017    Procedure: COLONOSCOPY, POSSIBLE BIOPSY, POSSIBLE POLYPECTOMY 51953;  Surgeon: Mina Conrad MD;  Location: [facility]    Colonoscopy N/A 02/11/2021    Procedure: COLONOSCOPY;  Surgeon: Taj Gibbs MD;  Location:  ENDOSCOPY    Eye surgery      Retinal detachment surgery    Other surgical history      laporatomy for cancer    Upper gi endoscopy,exam      tumor removed from duodm     Home Medications:   Cyanocobalamin (VITAMIN B-12 OR) Place under the tongue in the morning.      ACETYLCARNITINE OR Take 300 mg by mouth in the morning.      valACYclovir 1 G Oral Tab as needed (outbreaks).      Ascorbic Acid (VITAMIN C OR) Take 1,000 mg by mouth in the  morning.      Pantoprazole Sodium 40 MG Oral Tab EC Take 1 tablet (40 mg total) by mouth 2 (two) times daily before meals. 180 tablet 5    Multiple Vitamins-Minerals (TAB-A-ALEJANDRINA) Oral Tab Take 1 tablet by mouth in the morning.      Ergocalciferol (VITAMIN D OR) Take 2,000 Int'l Units/1.7m2 by mouth in the morning.      amLODIPine Besylate 5 MG Oral Tab Take 1 tablet (5 mg total) by mouth in the morning.      Atorvastatin Calcium 40 MG Oral Tab Take 1 tablet (40 mg total) by mouth in the morning.       Allergies:   Allergies   Allergen Reactions    Losartan ANGIOEDEMA       Psychosocial History:  Social History     Social History Narrative    , has 2 children and 2 grandkids.  Works in [employer] industrial [employer].     Social History     Socioeconomic History    Marital status:    Tobacco Use    Smoking status: Some Days     Types: Cigars    Smokeless tobacco: Never    Tobacco comments:     smokes a cigar about once a week    Vaping Use    Vaping status: Never Used   Substance and Sexual Activity    Alcohol use: Yes     Alcohol/week: 10.0 standard drinks of alcohol     Types: 10 Standard drinks or equivalent per week     Comment: 1-2 drinks/night.  no hx of excessive use    Drug use: Not Currently     Types: Cannabis     Comment: occasional use for knee pain, once/10 years   Social History Narrative    , has 2 children and 2 grandkids.  Works in sales- sells industrial lasers.     Family Medical History:  Family History   Problem Relation Age of Onset    Hypertension Mother     Lipids Mother     Heart Attack Mother     Cancer Father 81        colon ca    Heart Attack Maternal Grandmother     Cancer Maternal Grandfather         unknown type, dx >50y    Heart Attack Paternal Grandmother     Stroke Paternal Aunt         at a \"young age\"    Thyroid Disorder Daughter      Review of Systems:  A 10-point ROS was done with pertinent positives and negative per the HPI    Vital Signs:  Height: 182.9 cm  (6' 0.01\") (04/30 1007)  Weight: 117.3 kg (258 lb 8 oz) (04/30 1007)  BSA (Calculated - sq m): 2.38 sq meters (04/30 1007)  Pulse: 71 (04/30 1007)  BP: 152/82 (04/30 1007)  Temp: 98.2 °F (36.8 °C) (04/30 1007)  Do Not Use - Resp Rate: --  SpO2: 95 % (04/30 1007)    Wt Readings from Last 6 Encounters:   04/30/25 117.3 kg (258 lb 8 oz)   09/18/24 112.5 kg (248 lb)   09/06/24 114.3 kg (252 lb)   04/17/23 115.5 kg (254 lb 9.6 oz)   03/15/23 116.2 kg (256 lb 2 oz)   02/27/23 117.9 kg (260 lb)     ECOG PS: 0    Physical Examination:  General: Patient is alert and oriented, not in acute distress  Psych: Mood and affect are appropriate  Eyes: EOMI  ENT: Oropharynx is clear  CV: Regular rate and rhythm, no murmurs  Respiratory: Lungs clear to auscultation bilaterally  GI/Abd: Soft, non-tender with normoactive bowel sounds, no hepatosplenomegaly  Neurological: Grossly intact   Lymphatics: No palpable lymphadenopathy  Skin: no rashes or petechiae  Ext: mild BLE swelling, L>R    Laboratory:  Lab Results   Component Value Date    WBC 4.2 04/23/2025    WBC 5.2 09/18/2024    WBC 3.9 (L) 04/17/2023    HGB 14.9 04/23/2025    HGB 15.3 09/18/2024    HGB 15.3 04/17/2023    HCT 44.0 04/23/2025    MCV 88.9 04/23/2025    MCH 30.1 04/23/2025    MCHC 33.9 04/23/2025    RDW 12.5 04/23/2025    .0 04/23/2025    .0 09/18/2024    .0 04/17/2023     Lab Results   Component Value Date    GLU 97 04/23/2025    BUN 13 04/23/2025    BUNCREA 16.3 04/23/2025    CREATSERUM 0.80 04/23/2025    CREATSERUM 0.94 09/18/2024    CREATSERUM 0.80 04/17/2023    ANIONGAP 7 04/23/2025    GFRNAA 96 02/11/2021    GFRAA 111 02/11/2021    CA 8.7 04/23/2025    OSMOCALC 292 04/23/2025    ALKPHO 79 04/23/2025    AST 23 04/23/2025    ALT 27 04/23/2025    BILT 1.5 (H) 04/23/2025    TP 6.5 04/23/2025    ALB 3.9 04/23/2025    GLOBULIN 2.6 04/23/2025     04/23/2025    K 4.0 04/23/2025     04/23/2025    CO2 26.0 04/23/2025     Lab Results    Component Value Date    PTT 30.9 01/05/2021    INR 1.12 (H) 01/05/2021     Genetic Testing Result 4/17/23:  Negative for pathogenic variants. One variant of uncertain significance was identified. No pathogenic variant was found in the following 84 genes on the [employer] multi-cancer + RNA panel: AIP, ALK, APC*, HOUSTON*, AXIN2, BAP1, BARD1, BLM, BMPR1A, BRCA1, BRCA2, BRIP1, CASR, CDC73, CDH1, CDK4, CDKN1B, CDKN1C, CDKN2A (p14ARF), CDKN2A (z87ZAL6b), CEBPA, CHEK2, CTNNA1, DICER1*, DIS3L2, EGFR, EPCAM*, FH*, FLCN, GATA2, GPC3*, GREM1*, HOXB13, HRAS, KIT, MAX*, MEN1*, MET*, MITF*, MLH1*, MSH2*, MSH3*, MSH6*, MUTYH, NBN, NF1*, NF2, NTHL1, PALB2, PDGFRA, PHOX2B*, PMS2*, POLD1*, POLE, POT1, FQRLX4E, PTCH1, PTEN*, RAD50, RAD51C, RAD51D, RB1*, RECQL4*, RET, RUNX1, SDHA*, SDHAF2, SDHB, SDHC*, SDHD, SMAD4, SMARCA4, SMARCB1, SMARCE1, STK11, SUFU, TERC, TERT, DHLE035, TP53, TSC1*, TSC2, VHL, WRN*, WT1. A variant of uncertain significance, SDHA c.112G>C (p.Xdh71Tru), was identified.     Imaging:    As reviewed above    Impression & Plan:     *Zollinger Mishra syndrome/ well-differentiated, G1 duodenal neuroendocrine tumor  -Initially presented with primary tumor involving the duodenum with metastasis to portacaval, retroperitoneal, and mesenteric lymph nodes.  Given elevated gastrin level and clinical history of ZES symptoms prior to starting high-dose PPI his diagnosis fits best as Zollinger-Mishra syndrome/gastrinoma.  Prior to starting high-dose PPI he had frequent severe diarrhea, loss of appetite abdominal pain and ulcerative esophagitis.  After he started pantoprazole 40 mg BID symptoms significantly improved other than infrequent episodes of severe diarrhea.  -Received a single dose of lanreotide 4/17/23, then underwent EMR of duodenal tumor followed by surgical retroperitoneal lymph node dissection 5/2023 at [facility].  Tumor markers dropped following initial therapy though have mildly trended up since but  still remain markedly lower.  Imaging shows recurrent retroperitoneal tonya disease confirmed on EUS biopsy 9/6/24.  -Previously recommended resuming long-acting somatostatin analog to be continued indefinitely, however he wanted to hold off and observe only with plan to pursue repeat surgical resection upon further growth.  His most recent CT scan and serum tumor markers appear to be overall stable.  He remains asymptomatic.  -Recommend continuing observation:   -Repeat CT in 6 months (he will obtain at [facility] where he is also following)  -follow chromogranin A and gastrin levels as a tumor marker   -continue pantoprazole 40 mg BID  -Upon future progression would recommend repeat surgical resection versus initiating long-acting somatostatin inhibitor  -Germline genetic panel testing was negative     *prior lanreotide induced abd pain and diarrhea  -He previously had transient abdominal cramping pain and diarrhea following lanreotide injection.  I suspect this is less likely to occur if rechallenged at present time given his tumor burden and likely neuroendocrine secretion is lower.  -Will consider prescribing Bentyl prn if a somatostatin analog LAR is given again    RTC in 6 months    Dustin Rosas MD  Hematology/Medical Oncology  [facility]    Time spent on day of encounter:  Precharting, Face to face & orders  9122-1812 = 25 minutes  Documentation and coordination of care and independent review of imaging  7726-6997 = 20 minutes  TOTAL = 45 minutes     ongoing continuity of care related to malignancy.

## 2025-04-30 NOTE — PROGRESS NOTES
Outpatient Oncology Care Plan  Problem list:  knowledge deficit     Problems related to:    combined modality therapy     Interventions:  provided general teaching     Expected outcomes:  understands plan of care     Progress towards outcome:  making progress     Education Record     Learner:  Patient  Barriers / Limitations:  None  Method:  Brief focused  Outcome:  Shows understanding  Comments: 6 month md f/up neuroendocrine tumor. States his energy has been low. Denies any pain. Labs completed 4/23. CT disk uploaded.

## (undated) DEVICE — KIT VLV 5 PC AIR H2O SUCT BX ENDOGATOR CONN

## (undated) DEVICE — KNEE ARTHROSCOPY CDS: Brand: MEDLINE INDUSTRIES, INC.

## (undated) DEVICE — KIT CUSTOM ENDOPROCEDURE STERIS

## (undated) DEVICE — 3M™ RED DOT™ MONITORING ELECTRODE WITH FOAM TAPE AND STICKY GEL, 50/BAG, 20/CASE, 72/PLT 2570: Brand: RED DOT™

## (undated) DEVICE — STERILE POLYISOPRENE POWDER-FREE SURGICAL GLOVES WITH EMOLLIENT COATING: Brand: PROTEXIS

## (undated) DEVICE — FILTERLINE NASAL ADULT O2/CO2

## (undated) DEVICE — LIGHT HANDLE

## (undated) DEVICE — ZIMMER® STERILE DISPOSABLE TOURNIQUET CUFF WITH PLC, DUAL PORT, SINGLE BLADDER, 34 IN. (86 CM)

## (undated) DEVICE — ALCOHOL PREP,2 PLY, MEDIUM: Brand: WEBCOL

## (undated) DEVICE — 1200CC GUARDIAN II: Brand: GUARDIAN

## (undated) DEVICE — SYRINGE 10ML SLIP TIP

## (undated) DEVICE — Device: Brand: DEFENDO AIR/WATER/SUCTION AND BIOPSY VALVE

## (undated) DEVICE — BITEBLOCK ENDOSCP 60FR MAXI STRP

## (undated) DEVICE — BITE BLOCK ADULT 60F ELASTIC

## (undated) DEVICE — FORCEP BIOPSY RJ4 LG CAP W/ND

## (undated) DEVICE — ECHOTIP ACUCORE EUS BIOPSY NEEDLE: Brand: ECHOTIP ACUCORE

## (undated) DEVICE — GIJAW SINGLE-USE BIOPSY FORCEPS WITH NEEDLE: Brand: GIJAW

## (undated) DEVICE — CAP DST ATTCH 11.35X4MM SCP

## (undated) DEVICE — SYRINGE FLUSH IV PRE-FILL10M

## (undated) DEVICE — [AGGRESSIVE PLUS CUTTER, ARTHROSCOPIC SHAVER BLADE,  DO NOT RESTERILIZE,  DO NOT USE IF PACKAGE IS DAMAGED,  KEEP DRY,  KEEP AWAY FROM SUNLIGHT]: Brand: FORMULA

## (undated) DEVICE — SUTURE ETHILON 5-0 PS-3

## (undated) DEVICE — GLOVE SURG SENSICARE SZ 7

## (undated) DEVICE — FORCEP RADIAL JAW 4

## (undated) DEVICE — NON-ADHERENT STRIPS,OIL EMULSION: Brand: CURITY

## (undated) DEVICE — ENDOSCOPY PACK - LOWER: Brand: MEDLINE INDUSTRIES, INC.

## (undated) DEVICE — BALLOON HEMOSTATIC EUS LINEAR

## (undated) DEVICE — ENDOSCOPY PACK UPPER: Brand: MEDLINE INDUSTRIES, INC.

## (undated) DEVICE — SOL  .9 3000ML

## (undated) DEVICE — 10K/24K ARTHROSCOPY INFLOW TUBE SET: Brand: 10K/24K

## (undated) DEVICE — STERILE POLYISOPRENE POWDER-FREE SURGICAL GLOVES: Brand: PROTEXIS

## (undated) DEVICE — KIT MFLD FOR SPEC COLL

## (undated) DEVICE — PADDING CAST COTTON  4

## (undated) DEVICE — NEEDLE BIOPSY ACQUIRE OD22 GA

## (undated) DEVICE — KENDALL SCD EXPRESS SLEEVES, KNEE LENGTH, MEDIUM: Brand: KENDALL SCD

## (undated) DEVICE — 10FT COMBINED O2 DELIVERY/CO2 MONITORING. FILTER WITH MICROSTREAM TYPE LUER: Brand: DUAL ADULT NASAL CANNULA

## (undated) DEVICE — GLOVE,SURG,SENSICARE,ALOE,LF,PF,7: Brand: MEDLINE

## (undated) NOTE — LETTER
OSR/FRANCISCO Notification    Patient Name: Juan Gasca  -Age / Sex: 1955-A: 69 y  male  Medical Records: [id number] CSN: [id number]    Surgeon(s):  Surgeon(s):  Wayne Kirk MD   Procedure: ESOPHAGOGASTRODUODENOSCOPY (EGD),WITH UPPER ENDOSCOPIC ULTRASOUND (EUS), AND FINE NEEDLE ASPIRATION    Anesthesia Type: MAC Surgery Date: 24    During the pre-operative screening process using the STOP-Bang questionnaire, the patient listed above was identified as being at high risk for obstructive sleep apnea.    If you feel it is appropriate to schedule a sleep study, the [facility] will give priority to patients scheduled for procedures to minimize surgical delays.     If a sleep study is completed prior to surgery, please fax the results/plan of care to Pre-Admission Testing ([phone]) as this information will be utilized in clinical decision-making regarding the patient's upcoming surgery.    Thank you for your assistance in helping us provide a safe, seamless and personal experience for your patient.    Rodolfo Jolly MD  , Department of Anesthesia

## (undated) NOTE — LETTER
OSR/FRANCISCO Notification    Patient Name: Leticia Michelle CSN: [id number]  -Age / Sex: 1955-A: 75 y  male Medical Records: [id number]    Surgeon(s):  Surgeon(s):  Ozzy Kasper MD   Procedure: ESOPHAGOGASTRODUODENOSCOPY (EGD), UPPER ENDOSCOPIC ULTRASOUND WITH FINE NEEDLE ASPIRATION    Anesthesia Type: MAC Surgery Date: 2023    During the pre-operative screening process using the STOP-Bang questionnaire, the patient listed above was identified as being at high risk for obstructive sleep apnea. If you feel it is appropriate to schedule a sleep study, the [facility] will give priority to patients scheduled for procedures to minimize surgical delays. If a sleep study is completed prior to surgery, please fax the results/plan of care to [facility] ([phone]) as this information will be utilized in clinical decision-making regarding the patient's upcoming surgery. Thank you for your assistance in helping us provide a safe, seamless and personal experience for your patient.     Taylor Baron MD, PhD  [facility], Department of Anesthesia  [facility], Sleep Apnea Task Force